# Patient Record
Sex: FEMALE | Race: WHITE | NOT HISPANIC OR LATINO | Employment: OTHER | URBAN - NONMETROPOLITAN AREA
[De-identification: names, ages, dates, MRNs, and addresses within clinical notes are randomized per-mention and may not be internally consistent; named-entity substitution may affect disease eponyms.]

---

## 2017-02-15 ENCOUNTER — OFFICE VISIT (OUTPATIENT)
Dept: FAMILY MEDICINE CLINIC | Facility: CLINIC | Age: 82
End: 2017-02-15

## 2017-02-15 VITALS
OXYGEN SATURATION: 96 % | BODY MASS INDEX: 27.32 KG/M2 | DIASTOLIC BLOOD PRESSURE: 70 MMHG | SYSTOLIC BLOOD PRESSURE: 110 MMHG | WEIGHT: 154.2 LBS | TEMPERATURE: 97.2 F | HEIGHT: 63 IN | HEART RATE: 77 BPM

## 2017-02-15 DIAGNOSIS — R06.02 SHORTNESS OF BREATH: Primary | ICD-10-CM

## 2017-02-15 DIAGNOSIS — J01.90 ACUTE SINUSITIS, RECURRENCE NOT SPECIFIED, UNSPECIFIED LOCATION: Primary | ICD-10-CM

## 2017-02-15 DIAGNOSIS — K21.9 GASTROESOPHAGEAL REFLUX DISEASE WITHOUT ESOPHAGITIS: Chronic | ICD-10-CM

## 2017-02-15 DIAGNOSIS — Z72.0 TOBACCO USER: Chronic | ICD-10-CM

## 2017-02-15 DIAGNOSIS — I10 ESSENTIAL HYPERTENSION: Chronic | ICD-10-CM

## 2017-02-15 PROCEDURE — 99214 OFFICE O/P EST MOD 30 MIN: CPT | Performed by: NURSE PRACTITIONER

## 2017-02-15 PROCEDURE — 96372 THER/PROPH/DIAG INJ SC/IM: CPT | Performed by: NURSE PRACTITIONER

## 2017-02-15 RX ORDER — AZITHROMYCIN 250 MG/1
TABLET, FILM COATED ORAL
Qty: 6 TABLET | Refills: 0 | Status: SHIPPED | OUTPATIENT
Start: 2017-02-15 | End: 2017-03-16

## 2017-02-15 RX ORDER — ESOMEPRAZOLE MAGNESIUM 40 MG/1
40 CAPSULE, DELAYED RELEASE ORAL
Qty: 30 CAPSULE | Refills: 5 | Status: SHIPPED | OUTPATIENT
Start: 2017-02-15 | End: 2018-07-09

## 2017-02-15 RX ORDER — METHYLPREDNISOLONE ACETATE 80 MG/ML
80 INJECTION, SUSPENSION INTRA-ARTICULAR; INTRALESIONAL; INTRAMUSCULAR; SOFT TISSUE ONCE
Status: COMPLETED | OUTPATIENT
Start: 2017-02-15 | End: 2017-02-15

## 2017-02-15 RX ADMIN — METHYLPREDNISOLONE ACETATE 80 MG: 80 INJECTION, SUSPENSION INTRA-ARTICULAR; INTRALESIONAL; INTRAMUSCULAR; SOFT TISSUE at 15:15

## 2017-02-15 NOTE — PROGRESS NOTES
Subjective   Annika Kimball is a 81 y.o. female. Patient here today with complaints of Sinusitis  pt here today for complaints of sinus pain , pressure, congestion, headache, does have cough which is producing clear sputum. No hx of heart issues but she does report shortness of breath on exertion. She cont to smoke. No nausea,no chest pain. Having L max facial pain. No fever. Has hx of gerd and htn, needing refills on nexium.     Vitals:    02/15/17 1455   BP: 110/70   Pulse: 77   Temp: 97.2 °F (36.2 °C)   SpO2: 96%     Past Medical History   Diagnosis Date   • Acute bronchitis    • Acute sinusitis    • Dizziness    • Dyslipidemia    • Dysuria    • Essential hypertension    • Gastroesophageal reflux disease    • Generalized abdominal pain    • Tobacco user      Sinusitis   This is a new problem. The current episode started 1 to 4 weeks ago. The problem is unchanged. There has been no fever. The pain is mild. Associated symptoms include congestion, coughing, headaches, a hoarse voice, sinus pressure and a sore throat. Pertinent negatives include no chills, diaphoresis, ear pain, neck pain, shortness of breath, sneezing or swollen glands. Past treatments include nothing. The treatment provided no relief.   Heartburn   She complains of coughing, a hoarse voice and a sore throat. She reports no abdominal pain, no belching, no chest pain, no choking, no dysphagia, no early satiety, no globus sensation, no heartburn, no nausea, no stridor or no wheezing. This is a chronic problem. The current episode started more than 1 year ago. The problem occurs occasionally. The problem has been waxing and waning. The symptoms are aggravated by smoking and certain foods. Pertinent negatives include no anemia, fatigue, melena, muscle weakness, orthopnea or weight loss. Risk factors include smoking/tobacco exposure. She has tried a PPI for the symptoms. The treatment provided moderate relief.   Hypertension   This is a chronic  problem. The current episode started more than 1 year ago. The problem is unchanged. The problem is controlled. Associated symptoms include headaches. Pertinent negatives include no anxiety, blurred vision, chest pain, malaise/fatigue, neck pain, orthopnea, palpitations, peripheral edema, PND, shortness of breath or sweats. There are no associated agents to hypertension. Risk factors for coronary artery disease include post-menopausal state and smoking/tobacco exposure. Past treatments include beta blockers. The current treatment provides moderate improvement. Compliance problems include exercise and diet.  There is no history of angina, kidney disease, CAD/MI, CVA, heart failure, left ventricular hypertrophy, PVD, renovascular disease, retinopathy or a thyroid problem. There is no history of chronic renal disease, coarctation of the aorta, hyperaldosteronism, hypercortisolism, hyperparathyroidism, a hypertension causing med, pheochromocytoma or sleep apnea.        The following portions of the patient's history were reviewed and updated as appropriate: allergies, current medications, past family history, past medical history, past social history, past surgical history and problem list.    Review of Systems   Constitutional: Negative.  Negative for chills, diaphoresis, fatigue, malaise/fatigue and weight loss.   HENT: Positive for congestion, hoarse voice, sinus pressure and sore throat. Negative for ear pain and sneezing.    Eyes: Negative.  Negative for blurred vision.   Respiratory: Positive for cough. Negative for choking, shortness of breath and wheezing.    Cardiovascular: Negative.  Negative for chest pain, palpitations, orthopnea and PND.   Gastrointestinal: Negative.  Negative for abdominal pain, dysphagia, heartburn, melena and nausea.   Endocrine: Negative.    Genitourinary: Negative.    Musculoskeletal: Negative.  Negative for muscle weakness and neck pain.   Skin: Negative.    Allergic/Immunologic:  Negative.    Neurological: Positive for headaches.   Hematological: Negative.    Psychiatric/Behavioral: Negative.        Objective   Physical Exam   Constitutional: She is oriented to person, place, and time. She appears well-developed and well-nourished. No distress.   HENT:   Head: Normocephalic and atraumatic.   Right Ear: Hearing, external ear and ear canal normal. No drainage, swelling or tenderness. A middle ear effusion is present. No decreased hearing is noted.   Left Ear: Hearing, external ear and ear canal normal. No drainage, swelling or tenderness. A middle ear effusion is present. No decreased hearing is noted.   Nose: Right sinus exhibits maxillary sinus tenderness and frontal sinus tenderness. Left sinus exhibits maxillary sinus tenderness and frontal sinus tenderness.   Mouth/Throat: Uvula is midline and mucous membranes are normal. Posterior oropharyngeal erythema present. No oropharyngeal exudate. No tonsillar exudate.   Eyes: EOM are normal. Right eye exhibits no discharge. Left eye exhibits no discharge.   Neck: Neck supple. Normal carotid pulses present. Carotid bruit is not present.   Cardiovascular: Normal rate, regular rhythm and normal heart sounds.  Exam reveals no gallop and no friction rub.    No murmur heard.  Pulmonary/Chest: Effort normal. No respiratory distress. She has decreased breath sounds in the right upper field, the right middle field, the right lower field, the left upper field, the left middle field and the left lower field. She has wheezes in the right upper field and the left upper field. She has no rales.   insp wheezes ausc throghout, pulse ox on RA 96%   Musculoskeletal: Normal range of motion.   Lymphadenopathy:     She has no cervical adenopathy.   Neurological: She is alert and oriented to person, place, and time.   Skin: Skin is warm and dry. No rash noted. She is not diaphoretic. No erythema. No pallor.   Psychiatric: She has a normal mood and affect. Her behavior  is normal. Judgment and thought content normal.   Nursing note and vitals reviewed.      Assessment/Plan   Annika was seen today for sinusitis.    Diagnoses and all orders for this visit:    Acute sinusitis, recurrence not specified, unspecified location  -     methylPREDNISolone acetate (DEPO-medrol) injection 80 mg; Inject 1 mL into the shoulder, thigh, or buttocks 1 (One) Time.    Essential hypertension    Gastroesophageal reflux disease without esophagitis    Tobacco user    Other orders  -     esomeprazole (nexIUM) 40 MG capsule; Take 1 capsule by mouth Every Morning Before Breakfast.  -     azithromycin (ZITHROMAX Z-JEFFERY) 250 MG tablet; Take 2 tablets the first day, then 1 tablet daily for 4 days.      She is given zithromax and depo 80mg inj IM in office today, zithromax and refilled nexium. She is to RTC in 6 months or sooner if symptoms persist or worsen. Advised to stop smoking. All questions and concerns are addressed with understanding noted. The patient is in agreement to above plan.

## 2017-02-20 DIAGNOSIS — R50.9 FEVER, UNSPECIFIED FEVER CAUSE: Primary | ICD-10-CM

## 2017-02-20 LAB
ALBUMIN SERPL-MCNC: 4.5 G/DL (ref 3.2–5.5)
ALBUMIN/GLOB SERPL: 1.4 G/DL (ref 1–3)
ALP SERPL-CCNC: 75 U/L (ref 15–121)
ALT SERPL W P-5'-P-CCNC: 11 U/L (ref 10–60)
ANION GAP SERPL CALCULATED.3IONS-SCNC: 10 MMOL/L (ref 5–15)
AST SERPL-CCNC: 22 U/L (ref 10–60)
BASOPHILS # BLD AUTO: 0.02 10*3/MM3 (ref 0–0.2)
BASOPHILS NFR BLD AUTO: 0.3 % (ref 0–2)
BILIRUB SERPL-MCNC: 1.5 MG/DL (ref 0.2–1)
BUN BLD-MCNC: 12 MG/DL (ref 8–25)
BUN/CREAT SERPL: 12 (ref 7–25)
CALCIUM SPEC-SCNC: 9.5 MG/DL (ref 8.4–10.8)
CHLORIDE SERPL-SCNC: 100 MMOL/L (ref 100–112)
CO2 SERPL-SCNC: 23 MMOL/L (ref 20–32)
CREAT BLD-MCNC: 1 MG/DL (ref 0.4–1.3)
DEPRECATED RDW RBC AUTO: 41.4 FL (ref 36.4–46.3)
EOSINOPHIL # BLD AUTO: 0 10*3/MM3 (ref 0–0.7)
EOSINOPHIL NFR BLD AUTO: 0 % (ref 0–7)
ERYTHROCYTE [DISTWIDTH] IN BLOOD BY AUTOMATED COUNT: 12.6 % (ref 11.5–14.5)
FLUAV AG NPH QL: POSITIVE
FLUBV AG NPH QL IA: NEGATIVE
GFR SERPL CREATININE-BSD FRML MDRD: 53 ML/MIN/1.73 (ref 39–90)
GLOBULIN UR ELPH-MCNC: 3.2 GM/DL (ref 2.5–4.6)
GLUCOSE BLD-MCNC: 138 MG/DL (ref 70–100)
HCT VFR BLD AUTO: 41.9 % (ref 35–45)
HGB BLD-MCNC: 14.3 G/DL (ref 12–15.5)
LYMPHOCYTES # BLD AUTO: 0.75 10*3/MM3 (ref 0.6–4.2)
LYMPHOCYTES NFR BLD AUTO: 9.5 % (ref 10–50)
MCH RBC QN AUTO: 31.2 PG (ref 26.5–34)
MCHC RBC AUTO-ENTMCNC: 34.1 G/DL (ref 31.4–36)
MCV RBC AUTO: 91.3 FL (ref 80–98)
MONOCYTES # BLD AUTO: 0.92 10*3/MM3 (ref 0–0.9)
MONOCYTES NFR BLD AUTO: 11.6 % (ref 0–12)
NEUTROPHILS # BLD AUTO: 6.21 10*3/MM3 (ref 2–8.6)
NEUTROPHILS NFR BLD AUTO: 78.6 % (ref 37–80)
PLATELET # BLD AUTO: 237 10*3/MM3 (ref 150–450)
PMV BLD AUTO: 10.1 FL (ref 8–12)
POTASSIUM BLD-SCNC: 4.2 MMOL/L (ref 3.4–5.4)
PROT SERPL-MCNC: 7.7 G/DL (ref 6.7–8.2)
RBC # BLD AUTO: 4.59 10*6/MM3 (ref 3.77–5.16)
SODIUM BLD-SCNC: 133 MMOL/L (ref 134–146)
WBC NRBC COR # BLD: 7.9 10*3/MM3 (ref 3.2–9.8)

## 2017-02-20 PROCEDURE — 36415 COLL VENOUS BLD VENIPUNCTURE: CPT | Performed by: NURSE PRACTITIONER

## 2017-02-20 PROCEDURE — 80053 COMPREHEN METABOLIC PANEL: CPT | Performed by: NURSE PRACTITIONER

## 2017-02-20 PROCEDURE — 85025 COMPLETE CBC W/AUTO DIFF WBC: CPT | Performed by: NURSE PRACTITIONER

## 2017-02-20 PROCEDURE — 87804 INFLUENZA ASSAY W/OPTIC: CPT | Performed by: NURSE PRACTITIONER

## 2017-02-20 RX ORDER — OSELTAMIVIR PHOSPHATE 75 MG/1
75 CAPSULE ORAL 2 TIMES DAILY
Qty: 10 CAPSULE | Refills: 0 | Status: SHIPPED | OUTPATIENT
Start: 2017-02-20 | End: 2017-03-16

## 2017-03-16 ENCOUNTER — OFFICE VISIT (OUTPATIENT)
Dept: CARDIOLOGY | Facility: CLINIC | Age: 82
End: 2017-03-16

## 2017-03-16 VITALS
OXYGEN SATURATION: 97 % | HEART RATE: 70 BPM | HEIGHT: 63 IN | DIASTOLIC BLOOD PRESSURE: 92 MMHG | WEIGHT: 147 LBS | SYSTOLIC BLOOD PRESSURE: 142 MMHG | BODY MASS INDEX: 26.05 KG/M2

## 2017-03-16 DIAGNOSIS — Z71.6 TOBACCO ABUSE COUNSELING: ICD-10-CM

## 2017-03-16 DIAGNOSIS — I11.0 HYPERTENSIVE HEART DISEASE WITH HEART FAILURE (HCC): ICD-10-CM

## 2017-03-16 DIAGNOSIS — I10 ESSENTIAL HYPERTENSION: ICD-10-CM

## 2017-03-16 DIAGNOSIS — I20.9 ISCHEMIC CHEST PAIN (HCC): Primary | ICD-10-CM

## 2017-03-16 PROCEDURE — 93010 ELECTROCARDIOGRAM REPORT: CPT | Performed by: INTERNAL MEDICINE

## 2017-03-16 PROCEDURE — 99203 OFFICE O/P NEW LOW 30 MIN: CPT | Performed by: INTERNAL MEDICINE

## 2017-03-16 RX ORDER — NITROGLYCERIN 0.4 MG/1
TABLET SUBLINGUAL
Qty: 100 TABLET | Refills: 11 | Status: SHIPPED | OUTPATIENT
Start: 2017-03-16 | End: 2017-07-11

## 2017-03-16 NOTE — PROGRESS NOTES
Chief complaint : Chest pain    History of Present Illness this is a very pleasant 81-year-old female who presents for cardiac evaluation.  Patient admits of having chest tightness which status during mental stress or physical exertion.  Patient stated the chest discomfort or tightness radiates to her left shoulder and it is associated with dyspnea.  In a scale of 1-10 patient states it can be 7 out of 10.  It can last several minutes and occasionally on and off all day.    Subjective      Review of Systems   Constitution: Positive for night sweats. Negative for chills, decreased appetite, diaphoresis, fever, weakness, malaise/fatigue, weight gain and weight loss.   HENT: Positive for ear pain. Negative for congestion, headaches, hearing loss, hoarse voice, nosebleeds, odynophagia, sore throat, stridor and tinnitus.    Eyes: Positive for blurred vision, double vision, visual disturbance and visual halos. Negative for pain, photophobia, redness, vision loss in left eye and vision loss in right eye.   Cardiovascular: Positive for chest pain, dyspnea on exertion, irregular heartbeat and palpitations. Negative for leg swelling, near-syncope, orthopnea, paroxysmal nocturnal dyspnea and syncope.   Respiratory: Positive for cough, snoring and sputum production. Negative for hemoptysis, shortness of breath and wheezing.    Endocrine: Negative.    Hematologic/Lymphatic: Negative.    Musculoskeletal: Positive for arthritis and joint pain. Negative for back pain, falls, gout, muscle weakness, myalgias and neck pain.   Gastrointestinal: Positive for abdominal pain and heartburn. Negative for bloating, anorexia, change in bowel habit, bowel incontinence, constipation, diarrhea, dysphagia, excessive appetite, hematemesis, hemorrhoids, jaundice, melena, nausea and vomiting.   Genitourinary: Negative.    Neurological: Negative.    Psychiatric/Behavioral: Negative.    Allergic/Immunologic: Negative.        Past Medical History    Diagnosis Date   • Acute bronchitis    • Acute sinusitis    • Dizziness    • Dyslipidemia    • Dysuria    • Essential hypertension    • Gastroesophageal reflux disease    • Generalized abdominal pain    • Tobacco user        No family history on file.    Sulfa antibiotics     reports that she has been smoking Cigarettes.  She has been smoking about 1.00 pack per day. She does not have any smokeless tobacco history on file. She reports that she does not drink alcohol or use illicit drugs.    Objective     Vital Signs  Heart Rate:  [70] 70  BP: (142)/(92) 142/92    Physical Exam      ECG 12 Lead  Date/Time: 3/16/2017 2:23 PM  Performed by: LEONARD TORRES  Authorized by: LEONARD TORRES   Previous ECG: no previous ECG available  Rhythm: sinus rhythm  Rate: normal  BPM: 63  Conduction: conduction normal  ST Segments: ST segments normal  T Waves: T waves normal  QRS axis: normal  Other: no other findings  Clinical impression: normal ECG            Assessment/Plan     Patient Active Problem List   Diagnosis   • Tobacco user   • Tobacco abuse counseling   • Ischemic chest pain   • Essential hypertension   • Hypertensive heart disease with heart failure      1. Ischemic chest pain  The patient described her symptoms appears to be typical of angina pectoralis.  Patient has been advised regarding risk factor modification.  Plan;  -Nitroglycerin 0.4 mg sublingual as needed for chest pain  -Fasting lipid panel  - ECG 12 Lead  - Stress Test With Myocardial Perfusion One Day; Future  - Adult Transthoracic Echo Complete    2. Essential hypertension  Her blood pressure appears to be fairly controlled with current medications.  Plan:  -Risk factor modification , continue with current medication  - Adult Transthoracic Echo Complete    3. Tobacco abuse counseling  Patient has been counseled regarding tobacco smoking cessation.  Patient has agreed to cut back on her cigarettes.  She stated that she is ready to quit smoking.    4.  Hypertensive heart disease with heart failure   We will continue with current dose of atenolol.  We will order 2-D echocardiogram to evaluate patient's LV systolic function as well as diastolic function.  - Adult Transthoracic Echo Complete    I discussed the patients findings and my recommendations with patient.    Suzie Choi MD  03/16/17  2:40 PM    EMR Dragon/Transcription disclaimer:   Much of this encounter note is an electronic transcription/translation of spoken language to printed text. The electronic translation of spoken language may permit erroneous, or at times, nonsensical words or phrases to be inadvertently transcribed; Although I have reviewed the note for such errors, some may still exist.

## 2017-04-07 LAB
BH CV ECHO MEAS - AO MAX PG (FULL): 1.1 MMHG
BH CV ECHO MEAS - AO MAX PG: 5.3 MMHG
BH CV ECHO MEAS - AO MEAN PG (FULL): 1 MMHG
BH CV ECHO MEAS - AO MEAN PG: 3 MMHG
BH CV ECHO MEAS - AO V2 MAX: 115 CM/SEC
BH CV ECHO MEAS - AO V2 MEAN: 76.9 CM/SEC
BH CV ECHO MEAS - AO V2 VTI: 27.3 CM
BH CV ECHO MEAS - BSA(HAYCOCK): 1.7 M^2
BH CV ECHO MEAS - BSA: 1.7 M^2
BH CV ECHO MEAS - BZI_BMI: 26 KILOGRAMS/M^2
BH CV ECHO MEAS - BZI_METRIC_HEIGHT: 160 CM
BH CV ECHO MEAS - BZI_METRIC_WEIGHT: 66.7 KG
BH CV ECHO MEAS - CONTRAST EF 4CH: 54.9 ML/M^2
BH CV ECHO MEAS - EDV(MOD-SP4): 106 ML
BH CV ECHO MEAS - ESV(MOD-SP4): 47.8 ML
BH CV ECHO MEAS - LA DIMENSION: 3.3 CM
BH CV ECHO MEAS - LV DIASTOLIC VOL/BSA (35-75): 62.5 ML/M^2
BH CV ECHO MEAS - LV MAX PG: 4.2 MMHG
BH CV ECHO MEAS - LV MEAN PG: 2 MMHG
BH CV ECHO MEAS - LV SYSTOLIC VOL/BSA (12-30): 28.2 ML/M^2
BH CV ECHO MEAS - LV V1 MAX: 102 CM/SEC
BH CV ECHO MEAS - LV V1 MEAN: 61.8 CM/SEC
BH CV ECHO MEAS - LV V1 VTI: 28.4 CM
BH CV ECHO MEAS - LVLD AP4: 6.6 CM
BH CV ECHO MEAS - LVLS AP4: 6.2 CM
BH CV ECHO MEAS - MR MAX PG: 20.8 MMHG
BH CV ECHO MEAS - MR MAX VEL: 228 CM/SEC
BH CV ECHO MEAS - MV A MAX VEL: 109 CM/SEC
BH CV ECHO MEAS - MV DEC SLOPE: 422 CM/SEC^2
BH CV ECHO MEAS - MV E MAX VEL: 88.3 CM/SEC
BH CV ECHO MEAS - MV E/A: 0.81
BH CV ECHO MEAS - PULM DIAS VEL: 44.1 CM/SEC
BH CV ECHO MEAS - PULM S/D: 1.5
BH CV ECHO MEAS - PULM SYS VEL: 64 CM/SEC
BH CV ECHO MEAS - RAP SYSTOLE: 10 MMHG
BH CV ECHO MEAS - RVSP: 32.1 MMHG
BH CV ECHO MEAS - SI(MOD-SP4): 34.3 ML/M^2
BH CV ECHO MEAS - SV(MOD-SP4): 58.2 ML
BH CV ECHO MEAS - TR MAX VEL: 235 CM/SEC

## 2017-04-27 ENCOUNTER — OFFICE VISIT (OUTPATIENT)
Dept: CARDIOLOGY | Facility: CLINIC | Age: 82
End: 2017-04-27

## 2017-04-27 VITALS
HEIGHT: 63 IN | OXYGEN SATURATION: 95 % | SYSTOLIC BLOOD PRESSURE: 138 MMHG | HEART RATE: 71 BPM | BODY MASS INDEX: 27.04 KG/M2 | DIASTOLIC BLOOD PRESSURE: 74 MMHG | WEIGHT: 152.6 LBS

## 2017-04-27 DIAGNOSIS — Z71.6 TOBACCO ABUSE COUNSELING: ICD-10-CM

## 2017-04-27 DIAGNOSIS — I11.0 HYPERTENSIVE HEART DISEASE WITH HEART FAILURE (HCC): ICD-10-CM

## 2017-04-27 DIAGNOSIS — I10 ESSENTIAL HYPERTENSION: Primary | ICD-10-CM

## 2017-04-27 PROCEDURE — 99212 OFFICE O/P EST SF 10 MIN: CPT | Performed by: INTERNAL MEDICINE

## 2017-04-27 RX ORDER — BRIMONIDINE TARTRATE/TIMOLOL 0.2%-0.5%
2 DROPS OPHTHALMIC (EYE) 2 TIMES DAILY
COMMUNITY
Start: 2017-04-26 | End: 2017-06-05

## 2017-06-05 ENCOUNTER — OFFICE VISIT (OUTPATIENT)
Dept: FAMILY MEDICINE CLINIC | Facility: CLINIC | Age: 82
End: 2017-06-05

## 2017-06-05 VITALS
BODY MASS INDEX: 25.91 KG/M2 | HEART RATE: 81 BPM | SYSTOLIC BLOOD PRESSURE: 112 MMHG | TEMPERATURE: 98.5 F | WEIGHT: 146.2 LBS | OXYGEN SATURATION: 94 % | DIASTOLIC BLOOD PRESSURE: 72 MMHG | HEIGHT: 63 IN

## 2017-06-05 DIAGNOSIS — R05.9 COUGH: ICD-10-CM

## 2017-06-05 DIAGNOSIS — J32.9 SINUSITIS, UNSPECIFIED CHRONICITY, UNSPECIFIED LOCATION: Primary | ICD-10-CM

## 2017-06-05 PROCEDURE — 99213 OFFICE O/P EST LOW 20 MIN: CPT | Performed by: NURSE PRACTITIONER

## 2017-06-05 PROCEDURE — 96372 THER/PROPH/DIAG INJ SC/IM: CPT | Performed by: NURSE PRACTITIONER

## 2017-06-05 RX ORDER — PROMETHAZINE HYDROCHLORIDE AND CODEINE PHOSPHATE 6.25; 1 MG/5ML; MG/5ML
5 SYRUP ORAL EVERY 4 HOURS PRN
Qty: 240 ML | Refills: 0 | Status: SHIPPED | OUTPATIENT
Start: 2017-06-05 | End: 2017-06-21 | Stop reason: SDUPTHER

## 2017-06-05 RX ORDER — CEFDINIR 300 MG/1
300 CAPSULE ORAL 2 TIMES DAILY
Qty: 20 CAPSULE | Refills: 0 | Status: SHIPPED | OUTPATIENT
Start: 2017-06-05 | End: 2017-06-21

## 2017-06-05 RX ORDER — METHYLPREDNISOLONE ACETATE 80 MG/ML
80 INJECTION, SUSPENSION INTRA-ARTICULAR; INTRALESIONAL; INTRAMUSCULAR; SOFT TISSUE ONCE
Status: COMPLETED | OUTPATIENT
Start: 2017-06-05 | End: 2017-06-05

## 2017-06-05 RX ADMIN — METHYLPREDNISOLONE ACETATE 80 MG: 80 INJECTION, SUSPENSION INTRA-ARTICULAR; INTRALESIONAL; INTRAMUSCULAR; SOFT TISSUE at 11:04

## 2017-06-06 NOTE — PROGRESS NOTES
Subjective   Annika Kimball is a 81 y.o. female. Patient here today with complaints of Sinusitis ([facility] number [id number])  Patient here today with daughter complaining of cough which is producing yellowish sputum and thick nasal drainage which is yellowish in color as well.  She has been sick since Friday.  Has had sick contacts, complaining of headache, denies fever.  Cough is keeping her up all night.  She continues to smoke.    Vitals:    06/05/17 1018   BP: 112/72   Pulse: 81   Temp: 98.5 °F (36.9 °C)   SpO2: 94%     Past Medical History:   Diagnosis Date   • Acute bronchitis    • Acute sinusitis    • Dizziness    • Dyslipidemia    • Dysuria    • Essential hypertension    • Gastroesophageal reflux disease    • Generalized abdominal pain    • Tobacco user      Sinusitis   This is a new problem. The current episode started in the past 7 days. The problem is unchanged. There has been no fever. The pain is mild. Associated symptoms include congestion, coughing, headaches, sinus pressure and a sore throat. Pertinent negatives include no chills, diaphoresis, ear pain, hoarse voice, neck pain, shortness of breath, sneezing or swollen glands. Past treatments include nothing. The treatment provided no relief.   Cough   This is a new problem. The current episode started in the past 7 days. The problem has been unchanged. The problem occurs every few minutes. The cough is productive of sputum and productive of purulent sputum. Associated symptoms include ear congestion, headaches, nasal congestion, postnasal drip, rhinorrhea and a sore throat. Pertinent negatives include no chest pain, chills, ear pain, fever, heartburn, hemoptysis, myalgias, rash, shortness of breath, sweats, weight loss or wheezing. Nothing aggravates the symptoms. Risk factors for lung disease include smoking/tobacco exposure. She has tried nothing for the symptoms. The treatment provided no relief.        The following portions of the patient's  history were reviewed and updated as appropriate: allergies, current medications, past family history, past medical history, past social history, past surgical history and problem list.    Review of Systems   Constitutional: Negative.  Negative for chills, diaphoresis, fever and weight loss.   HENT: Positive for congestion, postnasal drip, rhinorrhea, sinus pressure and sore throat. Negative for ear pain, hoarse voice and sneezing.    Eyes: Negative.    Respiratory: Positive for cough. Negative for hemoptysis, shortness of breath and wheezing.    Cardiovascular: Negative.  Negative for chest pain.   Gastrointestinal: Negative.  Negative for heartburn.   Endocrine: Negative.    Genitourinary: Negative.    Musculoskeletal: Negative.  Negative for myalgias and neck pain.   Skin: Negative.  Negative for rash.   Allergic/Immunologic: Negative.    Neurological: Positive for headaches.   Hematological: Negative.    Psychiatric/Behavioral: Negative.        Objective   Physical Exam   Constitutional: She is oriented to person, place, and time. She appears well-developed and well-nourished. No distress.   HENT:   Head: Normocephalic and atraumatic.   Right Ear: Hearing, external ear and ear canal normal. No drainage, swelling or tenderness. Tympanic membrane is bulging. A middle ear effusion is present. No decreased hearing is noted.   Left Ear: Hearing, external ear and ear canal normal. No drainage, swelling or tenderness. Tympanic membrane is bulging. A middle ear effusion is present. No decreased hearing is noted.   Nose: Right sinus exhibits maxillary sinus tenderness and frontal sinus tenderness. Left sinus exhibits maxillary sinus tenderness and frontal sinus tenderness.   Mouth/Throat: Uvula is midline and mucous membranes are normal. Posterior oropharyngeal erythema present. No oropharyngeal exudate. No tonsillar exudate.   Eyes: Right eye exhibits no discharge. Left eye exhibits no discharge.   Neck: Neck supple.    Cardiovascular: Normal rate, regular rhythm and normal heart sounds.  Exam reveals no gallop and no friction rub.    No murmur heard.  Pulmonary/Chest: Effort normal and breath sounds normal. No respiratory distress. She has no wheezes. She has no rales.   Musculoskeletal: Normal range of motion.   Lymphadenopathy:     She has no cervical adenopathy.   Neurological: She is alert and oriented to person, place, and time.   Skin: Skin is warm and dry. No rash noted. She is not diaphoretic. No erythema. No pallor.   Psychiatric: She has a normal mood and affect. Her behavior is normal. Judgment and thought content normal.   Nursing note and vitals reviewed.      Assessment/Plan   Annika was seen today for sinusitis.    Diagnoses and all orders for this visit:    Sinusitis, unspecified chronicity, unspecified location  -     methylPREDNISolone acetate (DEPO-medrol) injection 80 mg; Inject 1 mL into the shoulder, thigh, or buttocks 1 (One) Time.    Cough    Other orders  -     cefdinir (OMNICEF) 300 MG capsule; Take 1 capsule by mouth 2 (Two) Times a Day.  -     promethazine-codeine (PHENERGAN with CODEINE) 6.25-10 MG/5ML syrup; Take 5 mL by mouth Every 4 (Four) Hours As Needed for Cough.      Yosvany is obtained and reviewed.  I will give her Phenergan with codeine cough syrup and Omnicef as above.  She is also given a Depo-Medrol 80 mg injection IM in office today.  If her symptoms should persist or worsen she is advised to return here for follow-up otherwise I will see her back as scheduled for chronic conditions.  I will also advise that she stop smoking although she does not have desire in this currently. YOSVANY query complete. Treatment plan to include limited course of prescribed controlled substance. Risks including addiction, benefits, and alternatives presented to patient.  All questions and concerns are addressed with understanding noted. The patient is in agreement to above plan.

## 2017-06-21 ENCOUNTER — OFFICE VISIT (OUTPATIENT)
Dept: FAMILY MEDICINE CLINIC | Facility: CLINIC | Age: 82
End: 2017-06-21

## 2017-06-21 VITALS
HEIGHT: 63 IN | BODY MASS INDEX: 25.69 KG/M2 | HEART RATE: 74 BPM | TEMPERATURE: 97.7 F | SYSTOLIC BLOOD PRESSURE: 124 MMHG | DIASTOLIC BLOOD PRESSURE: 80 MMHG | WEIGHT: 145 LBS

## 2017-06-21 DIAGNOSIS — K59.00 CONSTIPATION, UNSPECIFIED CONSTIPATION TYPE: ICD-10-CM

## 2017-06-21 DIAGNOSIS — Z72.0 TOBACCO USER: Chronic | ICD-10-CM

## 2017-06-21 DIAGNOSIS — J40 BRONCHITIS: Primary | ICD-10-CM

## 2017-06-21 DIAGNOSIS — I10 ESSENTIAL HYPERTENSION: Chronic | ICD-10-CM

## 2017-06-21 DIAGNOSIS — R05.9 COUGH: ICD-10-CM

## 2017-06-21 DIAGNOSIS — R63.4 WEIGHT LOSS, UNINTENTIONAL: ICD-10-CM

## 2017-06-21 PROCEDURE — 99214 OFFICE O/P EST MOD 30 MIN: CPT | Performed by: NURSE PRACTITIONER

## 2017-06-21 RX ORDER — ALBUTEROL SULFATE 90 UG/1
2 AEROSOL, METERED RESPIRATORY (INHALATION) EVERY 4 HOURS PRN
Qty: 1 INHALER | Refills: 0 | Status: SHIPPED | OUTPATIENT
Start: 2017-06-21 | End: 2017-07-11

## 2017-06-21 RX ORDER — PREDNISONE 20 MG/1
20 TABLET ORAL 2 TIMES DAILY
Qty: 10 TABLET | Refills: 0 | Status: SHIPPED | OUTPATIENT
Start: 2017-06-21 | End: 2017-06-26

## 2017-06-21 RX ORDER — PROMETHAZINE HYDROCHLORIDE AND CODEINE PHOSPHATE 6.25; 1 MG/5ML; MG/5ML
5 SYRUP ORAL EVERY 4 HOURS PRN
Qty: 240 ML | Refills: 0 | Status: SHIPPED | OUTPATIENT
Start: 2017-06-21 | End: 2017-07-11

## 2017-06-21 RX ORDER — DOXYCYCLINE HYCLATE 100 MG/1
100 TABLET, DELAYED RELEASE ORAL 2 TIMES DAILY
Qty: 14 TABLET | Refills: 0 | Status: SHIPPED | OUTPATIENT
Start: 2017-06-21 | End: 2017-06-28

## 2017-06-21 NOTE — PROGRESS NOTES
Subjective   Annika Kimball is a 81 y.o. female. Patient here today with complaints of Cough (still has cough that will not go away)  Patient here today with daughter complaining of sore throat, drainage, productive cough with history of tobacco abuse and bronchitis.  She was recently seen and treated and her symptoms did improve but never resolved and now they have worsened.  Symptoms worsened after she had been outside while daughter was mowing.  She reports decreased appetite and constipation with BM 2 days ago.  Denies nausea vomiting diarrhea.  Relates that she has lost approximately 15 pounds in the last 6 months.  Mammogram and colonoscopy reportedly up-to-date.  She did not have hysterectomy or BSO.  Denies abdominal pain.    Vitals:    06/21/17 1351   BP: 124/80   Pulse: 74   Temp: 97.7 °F (36.5 °C)     Past Medical History:   Diagnosis Date   • Acute bronchitis    • Acute sinusitis    • Dizziness    • Dyslipidemia    • Dysuria    • Essential hypertension    • Gastroesophageal reflux disease    • Generalized abdominal pain    • Tobacco user      Cough   This is a recurrent problem. The current episode started more than 1 month ago. The problem has been gradually worsening. The problem occurs every few minutes. The cough is productive of purulent sputum and productive of sputum. Associated symptoms include heartburn, nasal congestion, postnasal drip, a sore throat, weight loss and wheezing. Pertinent negatives include no chest pain, chills, ear congestion, ear pain, fever, headaches, hemoptysis, myalgias, rash, rhinorrhea, shortness of breath or sweats. The symptoms are aggravated by dust and pollens. Risk factors for lung disease include smoking/tobacco exposure. She has tried a beta-agonist inhaler for the symptoms. The treatment provided mild relief. Her past medical history is significant for bronchitis.        The following portions of the patient's history were reviewed and updated as appropriate:  allergies, current medications, past family history, past medical history, past social history, past surgical history and problem list.    Review of Systems   Constitutional: Positive for appetite change, unexpected weight change and weight loss. Negative for chills and fever.   HENT: Positive for postnasal drip and sore throat. Negative for ear pain and rhinorrhea.    Eyes: Negative.    Respiratory: Positive for cough and wheezing. Negative for hemoptysis and shortness of breath.    Cardiovascular: Negative.  Negative for chest pain.   Gastrointestinal: Positive for constipation and heartburn.   Endocrine: Negative.    Genitourinary: Negative.    Musculoskeletal: Negative.  Negative for myalgias.   Skin: Negative.  Negative for rash.   Allergic/Immunologic: Negative.    Neurological: Negative.  Negative for headaches.   Hematological: Negative.    Psychiatric/Behavioral: Negative.        Objective   Physical Exam   Constitutional: She is oriented to person, place, and time. She appears well-developed and well-nourished. No distress.   HENT:   Head: Normocephalic and atraumatic.   Right Ear: Hearing, tympanic membrane, external ear and ear canal normal.   Left Ear: Hearing, tympanic membrane, external ear and ear canal normal.   Nose: Right sinus exhibits no maxillary sinus tenderness and no frontal sinus tenderness. Left sinus exhibits no maxillary sinus tenderness and no frontal sinus tenderness.   Mouth/Throat: Uvula is midline, oropharynx is clear and moist and mucous membranes are normal. No tonsillar exudate.   Neck: Neck supple.   Cardiovascular: Normal rate, regular rhythm and normal heart sounds.  Exam reveals no gallop and no friction rub.    No murmur heard.  Pulmonary/Chest: Effort normal. No accessory muscle usage. No apnea, no tachypnea and no bradypnea. No respiratory distress. She has decreased breath sounds in the right upper field, the right middle field, the right lower field, the left upper  field, the left middle field and the left lower field. She has wheezes. She has rhonchi. She has no rales.   Abdominal: Soft. Bowel sounds are normal. She exhibits no distension and no mass. There is no tenderness. There is no rebound and no guarding. No hernia.   Musculoskeletal: Normal range of motion.   Lymphadenopathy:     She has no cervical adenopathy.   Neurological: She is alert and oriented to person, place, and time.   Skin: Skin is warm and dry. No rash noted. She is not diaphoretic. No erythema. No pallor.   Psychiatric: She has a normal mood and affect. Her behavior is normal. Judgment and thought content normal.   Nursing note and vitals reviewed.      Assessment/Plan   Annika was seen today for cough.    Diagnoses and all orders for this visit:    Bronchitis  -     CT Chest With Contrast; Future  -     XR Abdomen Flat & Upright  -     CBC & Differential; Future  -     Comprehensive Metabolic Panel  -     TSH; Future    Tobacco user  -     CT Chest With Contrast; Future  -     XR Abdomen Flat & Upright  -     CBC & Differential; Future  -     Comprehensive Metabolic Panel  -     TSH; Future    Essential hypertension  -     CT Chest With Contrast; Future  -     XR Abdomen Flat & Upright  -     CBC & Differential; Future  -     Comprehensive Metabolic Panel  -     TSH; Future    Cough  -     CT Chest With Contrast; Future  -     XR Abdomen Flat & Upright  -     CBC & Differential; Future  -     Comprehensive Metabolic Panel  -     TSH; Future    Weight loss, unintentional  -     CT Chest With Contrast; Future  -     XR Abdomen Flat & Upright  -     CBC & Differential; Future  -     Comprehensive Metabolic Panel  -     TSH; Future    Constipation, unspecified constipation type    Other orders  -     albuterol (PROVENTIL HFA;VENTOLIN HFA) 108 (90 BASE) MCG/ACT inhaler; Inhale 2 puffs Every 4 (Four) Hours As Needed for Wheezing.  -     predniSONE (DELTASONE) 20 MG tablet; Take 1 tablet by mouth 2 (Two)  Times a Day for 5 days.  -     promethazine-codeine (PHENERGAN with CODEINE) 6.25-10 MG/5ML syrup; Take 5 mL by mouth Every 4 (Four) Hours As Needed for Cough.  -     doxycycline (DORYX) 100 MG enteric coated tablet; Take 1 tablet by mouth 2 (Two) Times a Day for 7 days.      I will give her refill on Phenergan with codeine cough syrup to use when necessary I'll give her doxycycline, prednisone and albuterol inhaler as above.  She is again advised to stop smoking.  I will obtain CT of chest and abdominal x-ray as well as above labs.  She will be informed of results.  They will monitor her weight frequently and his symptoms or weight loss or cough should persist or worsen she may need further diagnostic testing/evaluation.  They are aware.  At present she will return to clinic for recheck as scheduled for chronic conditions or when necessary otherwise.  All questions and concerns are addressed with understanding noted. The patient is in agreement to above plan.

## 2017-06-26 ENCOUNTER — LAB (OUTPATIENT)
Dept: LAB | Facility: OTHER | Age: 82
End: 2017-06-26

## 2017-06-26 DIAGNOSIS — J40 BRONCHITIS: ICD-10-CM

## 2017-06-26 DIAGNOSIS — R63.4 WEIGHT LOSS, UNINTENTIONAL: ICD-10-CM

## 2017-06-26 DIAGNOSIS — R05.9 COUGH: ICD-10-CM

## 2017-06-26 DIAGNOSIS — I10 ESSENTIAL HYPERTENSION: ICD-10-CM

## 2017-06-26 DIAGNOSIS — Z00.00 ROUTINE MEDICAL EXAM: Primary | ICD-10-CM

## 2017-06-26 DIAGNOSIS — Z72.0 TOBACCO USER: ICD-10-CM

## 2017-06-26 LAB
ALBUMIN SERPL-MCNC: 3.8 G/DL (ref 3.2–5.5)
ALBUMIN/GLOB SERPL: 1.2 G/DL (ref 1–3)
ALP SERPL-CCNC: 84 U/L (ref 15–121)
ALT SERPL W P-5'-P-CCNC: 14 U/L (ref 10–60)
ANION GAP SERPL CALCULATED.3IONS-SCNC: 10 MMOL/L (ref 5–15)
AST SERPL-CCNC: 16 U/L (ref 10–60)
BASOPHILS # BLD AUTO: 0.02 10*3/MM3 (ref 0–0.2)
BASOPHILS NFR BLD AUTO: 0.2 % (ref 0–2)
BILIRUB SERPL-MCNC: 0.7 MG/DL (ref 0.2–1)
BUN BLD-MCNC: 14 MG/DL (ref 8–25)
BUN/CREAT SERPL: 15.6 (ref 7–25)
CALCIUM SPEC-SCNC: 9.2 MG/DL (ref 8.4–10.8)
CHLORIDE SERPL-SCNC: 99 MMOL/L (ref 100–112)
CO2 SERPL-SCNC: 27 MMOL/L (ref 20–32)
CREAT BLD-MCNC: 0.9 MG/DL (ref 0.4–1.3)
DEPRECATED RDW RBC AUTO: 42.2 FL (ref 36.4–46.3)
EOSINOPHIL # BLD AUTO: 0.03 10*3/MM3 (ref 0–0.7)
EOSINOPHIL NFR BLD AUTO: 0.2 % (ref 0–7)
ERYTHROCYTE [DISTWIDTH] IN BLOOD BY AUTOMATED COUNT: 12.8 % (ref 11.5–14.5)
GFR SERPL CREATININE-BSD FRML MDRD: 60 ML/MIN/1.73 (ref 39–90)
GLOBULIN UR ELPH-MCNC: 3.2 GM/DL (ref 2.5–4.6)
GLUCOSE BLD-MCNC: 92 MG/DL (ref 70–100)
HCT VFR BLD AUTO: 39.5 % (ref 35–45)
HGB BLD-MCNC: 13.1 G/DL (ref 12–15.5)
LYMPHOCYTES # BLD AUTO: 3.14 10*3/MM3 (ref 0.6–4.2)
LYMPHOCYTES NFR BLD AUTO: 25.8 % (ref 10–50)
MCH RBC QN AUTO: 30.9 PG (ref 26.5–34)
MCHC RBC AUTO-ENTMCNC: 33.2 G/DL (ref 31.4–36)
MCV RBC AUTO: 93.2 FL (ref 80–98)
MONOCYTES # BLD AUTO: 0.96 10*3/MM3 (ref 0–0.9)
MONOCYTES NFR BLD AUTO: 7.9 % (ref 0–12)
NEUTROPHILS # BLD AUTO: 8.03 10*3/MM3 (ref 2–8.6)
NEUTROPHILS NFR BLD AUTO: 65.9 % (ref 37–80)
PLATELET # BLD AUTO: 503 10*3/MM3 (ref 150–450)
PMV BLD AUTO: 9.1 FL (ref 8–12)
POTASSIUM BLD-SCNC: 3.2 MMOL/L (ref 3.4–5.4)
PROT SERPL-MCNC: 7 G/DL (ref 6.7–8.2)
RBC # BLD AUTO: 4.24 10*6/MM3 (ref 3.77–5.16)
SODIUM BLD-SCNC: 136 MMOL/L (ref 134–146)
WBC NRBC COR # BLD: 12.18 10*3/MM3 (ref 3.2–9.8)

## 2017-06-26 PROCEDURE — 84443 ASSAY THYROID STIM HORMONE: CPT | Performed by: NURSE PRACTITIONER

## 2017-06-26 PROCEDURE — 85025 COMPLETE CBC W/AUTO DIFF WBC: CPT | Performed by: NURSE PRACTITIONER

## 2017-06-26 PROCEDURE — 80053 COMPREHEN METABOLIC PANEL: CPT | Performed by: NURSE PRACTITIONER

## 2017-06-26 PROCEDURE — 36415 COLL VENOUS BLD VENIPUNCTURE: CPT | Performed by: NURSE PRACTITIONER

## 2017-06-27 LAB — TSH SERPL DL<=0.05 MIU/L-ACNC: 2.14 MIU/ML (ref 0.46–4.68)

## 2017-07-06 DIAGNOSIS — R91.8 LUNG NODULES: Primary | ICD-10-CM

## 2017-07-11 ENCOUNTER — OFFICE VISIT (OUTPATIENT)
Dept: FAMILY MEDICINE CLINIC | Facility: CLINIC | Age: 82
End: 2017-07-11

## 2017-07-11 VITALS
SYSTOLIC BLOOD PRESSURE: 122 MMHG | WEIGHT: 145 LBS | OXYGEN SATURATION: 98 % | HEIGHT: 63 IN | HEART RATE: 76 BPM | DIASTOLIC BLOOD PRESSURE: 78 MMHG | BODY MASS INDEX: 25.69 KG/M2 | TEMPERATURE: 97.9 F

## 2017-07-11 DIAGNOSIS — R30.0 DYSURIA: Primary | ICD-10-CM

## 2017-07-11 DIAGNOSIS — R25.2 CRAMP OF BOTH LOWER EXTREMITIES: ICD-10-CM

## 2017-07-11 DIAGNOSIS — E87.6 HYPOKALEMIA: ICD-10-CM

## 2017-07-11 DIAGNOSIS — Z72.0 TOBACCO USER: ICD-10-CM

## 2017-07-11 DIAGNOSIS — I10 ESSENTIAL HYPERTENSION: ICD-10-CM

## 2017-07-11 LAB
BACTERIA UR QL AUTO: ABNORMAL /HPF
BILIRUB UR QL STRIP: NEGATIVE
CLARITY UR: ABNORMAL
COLOR UR: YELLOW
GLUCOSE UR STRIP-MCNC: NEGATIVE MG/DL
HGB UR QL STRIP.AUTO: NEGATIVE
HYALINE CASTS UR QL AUTO: ABNORMAL /LPF
KETONES UR QL STRIP: NEGATIVE
LEUKOCYTE ESTERASE UR QL STRIP.AUTO: ABNORMAL
NITRITE UR QL STRIP: NEGATIVE
PH UR STRIP.AUTO: 6.5 [PH] (ref 5.5–8)
POTASSIUM BLD-SCNC: 4.2 MMOL/L (ref 3.4–5.4)
PROT UR QL STRIP: NEGATIVE
RBC # UR: ABNORMAL /HPF
REF LAB TEST METHOD: ABNORMAL
RENAL EPI CELLS #/AREA URNS HPF: ABNORMAL /HPF
SP GR UR STRIP: 1.01 (ref 1–1.03)
SQUAMOUS #/AREA URNS HPF: ABNORMAL /HPF
UROBILINOGEN UR QL STRIP: ABNORMAL
WBC UR QL AUTO: ABNORMAL /HPF

## 2017-07-11 PROCEDURE — 87086 URINE CULTURE/COLONY COUNT: CPT | Performed by: NURSE PRACTITIONER

## 2017-07-11 PROCEDURE — 81001 URINALYSIS AUTO W/SCOPE: CPT | Performed by: NURSE PRACTITIONER

## 2017-07-11 PROCEDURE — 36415 COLL VENOUS BLD VENIPUNCTURE: CPT | Performed by: NURSE PRACTITIONER

## 2017-07-11 PROCEDURE — 84132 ASSAY OF SERUM POTASSIUM: CPT | Performed by: NURSE PRACTITIONER

## 2017-07-11 PROCEDURE — 99214 OFFICE O/P EST MOD 30 MIN: CPT | Performed by: NURSE PRACTITIONER

## 2017-07-11 RX ORDER — CIPROFLOXACIN 250 MG/1
250 TABLET, FILM COATED ORAL 2 TIMES DAILY
Qty: 6 TABLET | Refills: 0 | Status: SHIPPED | OUTPATIENT
Start: 2017-07-11 | End: 2017-07-14

## 2017-07-11 NOTE — PROGRESS NOTES
Subjective   Annika Kimball is a 81 y.o. female. Patient here today with complaints of Urinary Tract Infection  Patient here today with complaints of dysuria and lower back pain ×1 week.  She is somewhat better now than she was initially.  Concerned about UTI.  Denies fever or chills.  Denies nausea vomiting diarrhea.  Relates cough has improved.  She is due for repeat CT of chest which she is aware of in 3 months.  Has increased water intake which she feels has improved her dysuria somewhat.  Also complaining of leg cramps and was told previously that she did have hypokalemia.  Has not been taking any potassium medication or increasing potassium in her diet.  She continues to smoke.    Vitals:    07/11/17 1428   BP: 122/78   Pulse: 76   Temp: 97.9 °F (36.6 °C)   SpO2: 98%     Past Medical History:   Diagnosis Date   • Acute bronchitis    • Acute sinusitis    • Dizziness    • Dyslipidemia    • Dysuria    • Essential hypertension    • Gastroesophageal reflux disease    • Generalized abdominal pain    • Tobacco user      Difficulty Urinating   This is a new problem. The current episode started in the past 7 days. The problem occurs intermittently. The problem has been waxing and waning. Associated symptoms include abdominal pain and myalgias. Pertinent negatives include no anorexia, arthralgias, change in bowel habit, chest pain, chills, congestion, coughing, diaphoresis, fatigue, fever, headaches, joint swelling, nausea, neck pain, numbness, rash, sore throat, swollen glands, urinary symptoms, vertigo, visual change, vomiting or weakness. Nothing aggravates the symptoms. She has tried drinking for the symptoms. The treatment provided mild relief.   Muscle Pain   This is a new problem. The current episode started in the past 7 days. The problem occurs intermittently. The problem has been waxing and waning since onset. The context of the pain is unknown. The pain is present in the left lower leg and right lower  leg. The pain is mild. Nothing aggravates the symptoms. Associated symptoms include abdominal pain and dysuria. Pertinent negatives include no chest pain, constipation, diarrhea, eye pain, fatigue, fever, headaches, joint swelling, nausea, rash, stiffness, sensory change, shortness of breath, swollen glands, urinary symptoms, vaginal discharge, visual change, vomiting, weakness or wheezing. Past treatments include nothing. There is no swelling present.        The following portions of the patient's history were reviewed and updated as appropriate: allergies, current medications, past family history, past medical history, past social history, past surgical history and problem list.    Review of Systems   Constitutional: Negative.  Negative for chills, diaphoresis, fatigue and fever.   HENT: Negative.  Negative for congestion and sore throat.    Eyes: Negative.  Negative for pain.   Respiratory: Negative.  Negative for cough, shortness of breath and wheezing.    Cardiovascular: Negative.  Negative for chest pain.   Gastrointestinal: Positive for abdominal pain. Negative for anorexia, change in bowel habit, constipation, diarrhea, nausea and vomiting.   Endocrine: Negative.    Genitourinary: Positive for difficulty urinating and dysuria. Negative for vaginal discharge.   Musculoskeletal: Positive for myalgias. Negative for arthralgias, joint swelling, neck pain and stiffness.   Skin: Negative.  Negative for rash.   Allergic/Immunologic: Negative.    Neurological: Negative.  Negative for vertigo, sensory change, weakness, numbness and headaches.   Hematological: Negative.    Psychiatric/Behavioral: Negative.        Objective   Physical Exam   Constitutional: She is oriented to person, place, and time. She appears well-developed and well-nourished. No distress.   HENT:   Head: Normocephalic and atraumatic.   Cardiovascular: Normal rate, regular rhythm, normal heart sounds and intact distal pulses.  Exam reveals no gallop  and no friction rub.    No murmur heard.  Pulmonary/Chest: Effort normal and breath sounds normal. No respiratory distress. She has no wheezes. She has no rales.   Kyphosis noted    Abdominal: Soft. Normal appearance and bowel sounds are normal. She exhibits no distension and no mass. There is no hepatosplenomegaly. There is no tenderness. There is no rigidity, no rebound, no guarding, no CVA tenderness, no tenderness at McBurney's point and negative Hernandez's sign. No hernia.   Musculoskeletal: She exhibits tenderness. She exhibits no edema.        Lumbar back: She exhibits tenderness and bony tenderness.   Varicose veins BLE , dp pulses palp bilat    Neurological: She is alert and oriented to person, place, and time.   Skin: Skin is warm and dry. No rash noted. She is not diaphoretic. No erythema. No pallor.   Psychiatric: She has a normal mood and affect. Her behavior is normal. Judgment and thought content normal.   Nursing note and vitals reviewed.      Assessment/Plan   Annika was seen today for urinary tract infection.    Diagnoses and all orders for this visit:    Dysuria    Cramp of both lower extremities  -     Potassium    Hypokalemia    Essential hypertension    Tobacco user    Other orders  -     ciprofloxacin (CIPRO) 250 MG tablet; Take 1 tablet by mouth 2 (Two) Times a Day for 3 days.    Urinalysis is obtained and culture will be added if necessary.  I will advise that she continue to increase clear fluids, water and cranberry juice and will be given Cipro as above.  I will repeat potassium today and if necessary can treat her with medication otherwise she will continue with over-the-counter diet changes and taking in an increased potassium rich foods.  If her symptoms should persist or worsen she will return to clinic for follow-up otherwise I will see her back in 3 months for recheck and repeat CT of chest and in 6 months for recheck of chronic conditions.  She is aware.  All questions and concerns  are addressed with understanding noted. The patient is in agreement to above plan.

## 2017-07-13 LAB — BACTERIA SPEC AEROBE CULT: NORMAL

## 2017-12-27 ENCOUNTER — OFFICE VISIT (OUTPATIENT)
Dept: FAMILY MEDICINE CLINIC | Facility: CLINIC | Age: 82
End: 2017-12-27

## 2017-12-27 VITALS
WEIGHT: 146 LBS | HEART RATE: 79 BPM | DIASTOLIC BLOOD PRESSURE: 80 MMHG | OXYGEN SATURATION: 92 % | SYSTOLIC BLOOD PRESSURE: 132 MMHG | HEIGHT: 63 IN | BODY MASS INDEX: 25.87 KG/M2 | TEMPERATURE: 99.3 F

## 2017-12-27 DIAGNOSIS — J40 BRONCHITIS: Primary | ICD-10-CM

## 2017-12-27 DIAGNOSIS — R05.9 COUGH: ICD-10-CM

## 2017-12-27 DIAGNOSIS — J32.9 SINUSITIS, UNSPECIFIED CHRONICITY, UNSPECIFIED LOCATION: ICD-10-CM

## 2017-12-27 PROCEDURE — 96372 THER/PROPH/DIAG INJ SC/IM: CPT | Performed by: NURSE PRACTITIONER

## 2017-12-27 PROCEDURE — 99213 OFFICE O/P EST LOW 20 MIN: CPT | Performed by: NURSE PRACTITIONER

## 2017-12-27 RX ORDER — AZITHROMYCIN 250 MG/1
TABLET, FILM COATED ORAL
Qty: 6 TABLET | Refills: 0 | Status: SHIPPED | OUTPATIENT
Start: 2017-12-27 | End: 2018-07-09

## 2017-12-27 RX ORDER — ALBUTEROL SULFATE 90 UG/1
2 AEROSOL, METERED RESPIRATORY (INHALATION) EVERY 4 HOURS PRN
Qty: 1 INHALER | Refills: 0 | Status: SHIPPED | OUTPATIENT
Start: 2017-12-27 | End: 2018-07-09

## 2017-12-27 RX ORDER — TRIAMCINOLONE ACETONIDE 40 MG/ML
40 INJECTION, SUSPENSION INTRA-ARTICULAR; INTRAMUSCULAR ONCE
Status: COMPLETED | OUTPATIENT
Start: 2017-12-27 | End: 2017-12-27

## 2017-12-27 RX ORDER — BENZONATATE 200 MG/1
200 CAPSULE ORAL 3 TIMES DAILY PRN
Qty: 30 CAPSULE | Refills: 0 | Status: SHIPPED | OUTPATIENT
Start: 2017-12-27 | End: 2018-07-09

## 2017-12-27 RX ADMIN — TRIAMCINOLONE ACETONIDE 40 MG: 40 INJECTION, SUSPENSION INTRA-ARTICULAR; INTRAMUSCULAR at 09:22

## 2017-12-27 NOTE — PROGRESS NOTES
Subjective   Annika Kimball is a 82 y.o. female. Patient here today with complaints of Bronchitis  pt here today with complaints of productive cough , producing thick sputum. Has been sick x 4 days. Has had sick contacts. Cannot sleep due to cough. Also has nasal congestion, low grade fever. Good I&O.     Vitals:    12/27/17 0901   BP: 132/80   Pulse: 79   Temp: 99.3 °F (37.4 °C)   SpO2: 92%     Past Medical History:   Diagnosis Date   • Acute bronchitis    • Acute sinusitis    • Dizziness    • Dyslipidemia    • Dysuria    • Essential hypertension    • Gastroesophageal reflux disease    • Generalized abdominal pain    • Tobacco user      Cough   This is a new problem. The current episode started in the past 7 days. The problem has been gradually worsening. The problem occurs every few minutes. The cough is productive of sputum and productive of purulent sputum. Associated symptoms include ear congestion, a fever, headaches, nasal congestion, postnasal drip, rhinorrhea, a sore throat and wheezing. Pertinent negatives include no chest pain, chills, ear pain, heartburn, hemoptysis, myalgias, rash, shortness of breath, sweats or weight loss. Risk factors for lung disease include smoking/tobacco exposure.   Sinusitis   This is a new problem. The current episode started in the past 7 days. The problem is unchanged. Associated symptoms include coughing, headaches, a hoarse voice, sinus pressure and a sore throat. Pertinent negatives include no chills, ear pain, neck pain, shortness of breath, sneezing or swollen glands. Past treatments include nothing. The treatment provided no relief.        The following portions of the patient's history were reviewed and updated as appropriate: allergies, current medications, past family history, past medical history, past social history, past surgical history and problem list.    Review of Systems   Constitutional: Positive for fever. Negative for chills and weight loss.   HENT:  Positive for hoarse voice, postnasal drip, rhinorrhea, sinus pressure and sore throat. Negative for ear pain and sneezing.    Eyes: Negative.    Respiratory: Positive for cough and wheezing. Negative for hemoptysis and shortness of breath.    Cardiovascular: Negative.  Negative for chest pain.   Gastrointestinal: Negative.  Negative for heartburn.   Endocrine: Negative.    Genitourinary: Negative.    Musculoskeletal: Negative.  Negative for myalgias and neck pain.   Skin: Negative.  Negative for rash.   Allergic/Immunologic: Negative.    Neurological: Positive for headaches.   Hematological: Negative.    Psychiatric/Behavioral: Negative.        Objective   Physical Exam   Constitutional: She is oriented to person, place, and time. She appears well-developed and well-nourished. No distress.   HENT:   Head: Normocephalic and atraumatic.   Right Ear: Hearing, external ear and ear canal normal. No drainage, swelling or tenderness. A middle ear effusion is present. No decreased hearing is noted.   Left Ear: Hearing, external ear and ear canal normal. No drainage, swelling or tenderness. A middle ear effusion is present. No decreased hearing is noted.   Nose: Right sinus exhibits maxillary sinus tenderness and frontal sinus tenderness. Left sinus exhibits maxillary sinus tenderness and frontal sinus tenderness.   Mouth/Throat: Uvula is midline and mucous membranes are normal. Posterior oropharyngeal erythema present. No tonsillar exudate.   Neck: Neck supple.   Cardiovascular: Normal rate, regular rhythm and normal heart sounds.  Exam reveals no gallop and no friction rub.    No murmur heard.  Pulmonary/Chest: Effort normal. No respiratory distress. She has wheezes. She has no rales.   Scattered wheezes auscultated throughout, pulse ox on room air 92%   Lymphadenopathy:     She has no cervical adenopathy.   Neurological: She is alert and oriented to person, place, and time.   Skin: Skin is warm and dry. No rash noted. She  is not diaphoretic. No erythema. No pallor.   Psychiatric: She has a normal mood and affect. Her behavior is normal.   Nursing note and vitals reviewed.      Assessment/Plan   Annika was seen today for bronchitis.    Diagnoses and all orders for this visit:    Bronchitis    Sinusitis, unspecified chronicity, unspecified location  -     triamcinolone acetonide (KENALOG-40) injection 40 mg; Inject 1 mL into the shoulder, thigh, or buttocks 1 (One) Time.    Cough    Other orders  -     albuterol (PROVENTIL HFA;VENTOLIN HFA) 108 (90 Base) MCG/ACT inhaler; Inhale 2 puffs Every 4 (Four) Hours As Needed for Wheezing.  -     azithromycin (ZITHROMAX Z-JEFFERY) 250 MG tablet; Take 2 tablets the first day, then 1 tablet daily for 4 days.  -     benzonatate (TESSALON) 200 MG capsule; Take 1 capsule by mouth 3 (Three) Times a Day As Needed for Cough.    She is given Kenalog injection IM in office today, advised to stop smoking, given Zithromax, Tessalon Perles when necessary cough and Proventil when necessary cough/wheeze.  If her symptoms should persist or worsen she will return to clinic for follow-up.  Otherwise, I will see her back as scheduled for chronic conditions.  She is aware and is in agreement to this plan.  All questions and concerns are addressed with understanding noted.

## 2018-02-01 RX ORDER — OSELTAMIVIR PHOSPHATE 75 MG/1
75 CAPSULE ORAL DAILY
Qty: 10 CAPSULE | Refills: 0 | Status: SHIPPED | OUTPATIENT
Start: 2018-02-01 | End: 2018-07-09

## 2018-02-27 RX ORDER — ATENOLOL 50 MG/1
TABLET ORAL
Qty: 30 TABLET | Refills: 5 | Status: SHIPPED | OUTPATIENT
Start: 2018-02-27 | End: 2019-07-10

## 2018-07-09 ENCOUNTER — OFFICE VISIT (OUTPATIENT)
Dept: FAMILY MEDICINE CLINIC | Facility: CLINIC | Age: 83
End: 2018-07-09

## 2018-07-09 ENCOUNTER — LAB (OUTPATIENT)
Dept: LAB | Facility: OTHER | Age: 83
End: 2018-07-09

## 2018-07-09 VITALS
HEART RATE: 78 BPM | TEMPERATURE: 97.7 F | WEIGHT: 156.4 LBS | HEIGHT: 63 IN | BODY MASS INDEX: 27.71 KG/M2 | DIASTOLIC BLOOD PRESSURE: 72 MMHG | SYSTOLIC BLOOD PRESSURE: 122 MMHG

## 2018-07-09 DIAGNOSIS — J01.10 ACUTE NON-RECURRENT FRONTAL SINUSITIS: ICD-10-CM

## 2018-07-09 DIAGNOSIS — J01.00 ACUTE NON-RECURRENT MAXILLARY SINUSITIS: Primary | ICD-10-CM

## 2018-07-09 DIAGNOSIS — E78.5 DYSLIPIDEMIA: ICD-10-CM

## 2018-07-09 DIAGNOSIS — I10 ESSENTIAL HYPERTENSION: ICD-10-CM

## 2018-07-09 DIAGNOSIS — Z72.0 TOBACCO USER: Chronic | ICD-10-CM

## 2018-07-09 DIAGNOSIS — R73.09 ELEVATED GLUCOSE: ICD-10-CM

## 2018-07-09 DIAGNOSIS — E78.5 DYSLIPIDEMIA: Chronic | ICD-10-CM

## 2018-07-09 DIAGNOSIS — I10 ESSENTIAL HYPERTENSION: Chronic | ICD-10-CM

## 2018-07-09 DIAGNOSIS — R73.09 ELEVATED GLUCOSE: Primary | ICD-10-CM

## 2018-07-09 LAB
ALBUMIN SERPL-MCNC: 4.5 G/DL (ref 3.5–5)
ALBUMIN/GLOB SERPL: 1.6 G/DL (ref 1.1–1.8)
ALP SERPL-CCNC: 63 U/L (ref 38–126)
ALT SERPL W P-5'-P-CCNC: 16 U/L
ANION GAP SERPL CALCULATED.3IONS-SCNC: 10 MMOL/L (ref 5–15)
AST SERPL-CCNC: 26 U/L (ref 14–36)
BASOPHILS # BLD AUTO: 0.01 10*3/MM3 (ref 0–0.2)
BASOPHILS NFR BLD AUTO: 0.2 % (ref 0–2)
BILIRUB SERPL-MCNC: 1 MG/DL (ref 0.2–1.3)
BUN BLD-MCNC: 14 MG/DL (ref 7–17)
BUN/CREAT SERPL: 14.1 (ref 7–25)
CALCIUM SPEC-SCNC: 9.4 MG/DL (ref 8.4–10.2)
CHLORIDE SERPL-SCNC: 102 MMOL/L (ref 98–107)
CHOLEST SERPL-MCNC: 246 MG/DL (ref 150–200)
CO2 SERPL-SCNC: 28 MMOL/L (ref 22–30)
CREAT BLD-MCNC: 0.99 MG/DL (ref 0.52–1.04)
DEPRECATED RDW RBC AUTO: 43.3 FL (ref 36.4–46.3)
EOSINOPHIL # BLD AUTO: 0.12 10*3/MM3 (ref 0–0.7)
EOSINOPHIL NFR BLD AUTO: 1.8 % (ref 0–7)
ERYTHROCYTE [DISTWIDTH] IN BLOOD BY AUTOMATED COUNT: 12.8 % (ref 11.5–14.5)
GFR SERPL CREATININE-BSD FRML MDRD: 54 ML/MIN/1.73 (ref 39–90)
GLOBULIN UR ELPH-MCNC: 2.9 GM/DL (ref 2.3–3.5)
GLUCOSE BLD-MCNC: 114 MG/DL (ref 74–99)
HBA1C MFR BLD: 6 % (ref 4–5.6)
HCT VFR BLD AUTO: 42.9 % (ref 35–45)
HDLC SERPL-MCNC: 68 MG/DL (ref 40–59)
HGB BLD-MCNC: 14.3 G/DL (ref 12–15.5)
LDLC SERPL CALC-MCNC: 160 MG/DL
LDLC/HDLC SERPL: 2.35 {RATIO} (ref 0–3.22)
LYMPHOCYTES # BLD AUTO: 2.46 10*3/MM3 (ref 0.6–4.2)
LYMPHOCYTES NFR BLD AUTO: 36.9 % (ref 10–50)
MCH RBC QN AUTO: 31.7 PG (ref 26.5–34)
MCHC RBC AUTO-ENTMCNC: 33.3 G/DL (ref 31.4–36)
MCV RBC AUTO: 95.1 FL (ref 80–98)
MONOCYTES # BLD AUTO: 0.57 10*3/MM3 (ref 0–0.9)
MONOCYTES NFR BLD AUTO: 8.6 % (ref 0–12)
NEUTROPHILS # BLD AUTO: 3.5 10*3/MM3 (ref 2–8.6)
NEUTROPHILS NFR BLD AUTO: 52.5 % (ref 37–80)
PLATELET # BLD AUTO: 280 10*3/MM3 (ref 150–450)
PMV BLD AUTO: 9.4 FL (ref 8–12)
POTASSIUM BLD-SCNC: 4 MMOL/L (ref 3.4–5)
PROT SERPL-MCNC: 7.4 G/DL (ref 6.3–8.2)
RBC # BLD AUTO: 4.51 10*6/MM3 (ref 3.77–5.16)
SODIUM BLD-SCNC: 140 MMOL/L (ref 137–145)
TRIGL SERPL-MCNC: 92 MG/DL
TSH SERPL DL<=0.05 MIU/L-ACNC: 3.31 MIU/ML (ref 0.46–4.68)
VLDLC SERPL-MCNC: 18.4 MG/DL
WBC NRBC COR # BLD: 6.66 10*3/MM3 (ref 3.2–9.8)

## 2018-07-09 PROCEDURE — 96372 THER/PROPH/DIAG INJ SC/IM: CPT | Performed by: NURSE PRACTITIONER

## 2018-07-09 PROCEDURE — 36415 COLL VENOUS BLD VENIPUNCTURE: CPT | Performed by: NURSE PRACTITIONER

## 2018-07-09 PROCEDURE — 80053 COMPREHEN METABOLIC PANEL: CPT | Performed by: NURSE PRACTITIONER

## 2018-07-09 PROCEDURE — 80061 LIPID PANEL: CPT | Performed by: NURSE PRACTITIONER

## 2018-07-09 PROCEDURE — 83036 HEMOGLOBIN GLYCOSYLATED A1C: CPT | Performed by: NURSE PRACTITIONER

## 2018-07-09 PROCEDURE — 85025 COMPLETE CBC W/AUTO DIFF WBC: CPT | Performed by: NURSE PRACTITIONER

## 2018-07-09 PROCEDURE — 99214 OFFICE O/P EST MOD 30 MIN: CPT | Performed by: NURSE PRACTITIONER

## 2018-07-09 PROCEDURE — 84443 ASSAY THYROID STIM HORMONE: CPT | Performed by: NURSE PRACTITIONER

## 2018-07-09 RX ORDER — CEFDINIR 300 MG/1
300 CAPSULE ORAL 2 TIMES DAILY
Qty: 20 CAPSULE | Refills: 0 | Status: SHIPPED | OUTPATIENT
Start: 2018-07-09 | End: 2018-07-26

## 2018-07-09 RX ORDER — FLUTICASONE PROPIONATE 50 MCG
2 SPRAY, SUSPENSION (ML) NASAL DAILY
Qty: 1 BOTTLE | Refills: 0 | Status: SHIPPED | OUTPATIENT
Start: 2018-07-09 | End: 2018-07-26

## 2018-07-09 RX ORDER — TRIAMCINOLONE ACETONIDE 40 MG/ML
80 INJECTION, SUSPENSION INTRA-ARTICULAR; INTRAMUSCULAR ONCE
Status: COMPLETED | OUTPATIENT
Start: 2018-07-09 | End: 2018-07-09

## 2018-07-09 RX ORDER — LORATADINE 10 MG/1
10 TABLET ORAL DAILY
Qty: 30 TABLET | Refills: 0 | Status: SHIPPED | OUTPATIENT
Start: 2018-07-09 | End: 2018-08-31 | Stop reason: ALTCHOICE

## 2018-07-09 RX ADMIN — TRIAMCINOLONE ACETONIDE 80 MG: 40 INJECTION, SUSPENSION INTRA-ARTICULAR; INTRAMUSCULAR at 11:12

## 2018-07-09 NOTE — PROGRESS NOTES
Subjective   Annika Kimball is a 82 y.o. female. Patient here today with complaints of Sinusitis  Patient here today with complaints of ear congestion, eyes red and itching, nasal congestion and sneezing.  Symptoms present ×1 week.  She continues to smoke.  Has history of hyperlipidemia, hypertension, due for labs.    Vitals:    07/09/18 1056   BP: 122/72   Pulse: 78   Temp: 97.7 °F (36.5 °C)     Past Medical History:   Diagnosis Date   • Acute bronchitis    • Acute sinusitis    • Dizziness    • Dyslipidemia    • Dysuria    • Essential hypertension    • Gastroesophageal reflux disease    • Generalized abdominal pain    • Tobacco user      Sinusitis   This is a new problem. The current episode started in the past 7 days. The problem has been gradually worsening since onset. There has been no fever. The pain is mild. Associated symptoms include congestion, coughing, ear pain, headaches, a hoarse voice, sinus pressure, sneezing and a sore throat. Pertinent negatives include no chills, diaphoresis, neck pain, shortness of breath or swollen glands. Past treatments include nothing. The treatment provided no relief.   Hypertension   This is a chronic problem. The current episode started more than 1 year ago. The problem is unchanged. The problem is controlled. Associated symptoms include headaches. Pertinent negatives include no anxiety, blurred vision, chest pain, malaise/fatigue, neck pain, orthopnea, palpitations, peripheral edema, PND, shortness of breath or sweats. Risk factors for coronary artery disease include smoking/tobacco exposure, post-menopausal state, obesity and dyslipidemia. Past treatments include lifestyle changes and beta blockers. Current antihypertension treatment includes lifestyle changes and beta blockers. The current treatment provides moderate improvement. Compliance problems include exercise and diet.  There is no history of chronic renal disease.   Hyperlipidemia   This is a chronic  problem. The current episode started more than 1 year ago. Exacerbating diseases include obesity. She has no history of chronic renal disease, diabetes, hypothyroidism, liver disease or nephrotic syndrome. Factors aggravating her hyperlipidemia include beta blockers, fatty foods and smoking. Pertinent negatives include no chest pain, focal sensory loss, focal weakness, leg pain, myalgias or shortness of breath. Current antihyperlipidemic treatment includes exercise and diet change. Compliance problems include adherence to exercise, adherence to diet and medication side effects.  Risk factors for coronary artery disease include post-menopausal, obesity, hypertension and dyslipidemia.        The following portions of the patient's history were reviewed and updated as appropriate: allergies, current medications, past family history, past medical history, past social history, past surgical history and problem list.    Review of Systems   Constitutional: Negative.  Negative for chills, diaphoresis and malaise/fatigue.   HENT: Positive for congestion, ear pain, hoarse voice, sinus pressure, sneezing and sore throat.    Eyes: Negative.  Negative for blurred vision.   Respiratory: Positive for cough. Negative for shortness of breath.    Cardiovascular: Negative.  Negative for chest pain, palpitations, orthopnea and PND.   Gastrointestinal: Negative.    Endocrine: Negative.    Genitourinary: Negative.    Musculoskeletal: Negative.  Negative for myalgias and neck pain.   Skin: Negative.    Allergic/Immunologic: Negative.    Neurological: Positive for headaches. Negative for focal weakness.   Hematological: Negative.    Psychiatric/Behavioral: Negative.        Objective   Physical Exam   Constitutional: She is oriented to person, place, and time. She appears well-developed and well-nourished. No distress.   HENT:   Head: Normocephalic and atraumatic.   Right Ear: Hearing, external ear and ear canal normal. Tympanic membrane is  bulging.   Left Ear: Hearing, external ear and ear canal normal. Tympanic membrane is bulging.   Nose: Right sinus exhibits maxillary sinus tenderness and frontal sinus tenderness. Left sinus exhibits maxillary sinus tenderness and frontal sinus tenderness.   Mouth/Throat: Uvula is midline and mucous membranes are normal. Posterior oropharyngeal erythema present. No tonsillar exudate.   Neck: Neck supple.   Cardiovascular: Normal rate, regular rhythm and normal heart sounds.  Exam reveals no gallop and no friction rub.    No murmur heard.  Pulmonary/Chest: Effort normal and breath sounds normal. No respiratory distress. She has no wheezes. She has no rales.   Lymphadenopathy:     She has no cervical adenopathy.   Neurological: She is alert and oriented to person, place, and time.   Skin: Skin is warm and dry. No rash noted. She is not diaphoretic. No erythema. No pallor.   Psychiatric: She has a normal mood and affect. Her behavior is normal.   Nursing note and vitals reviewed.      Assessment/Plan   Annika was seen today for sinusitis.    Diagnoses and all orders for this visit:    Acute non-recurrent maxillary sinusitis  -     triamcinolone acetonide (KENALOG-40) injection 80 mg; Inject 2 mL into the shoulder, thigh, or buttocks 1 (One) Time.    Acute non-recurrent frontal sinusitis  -     triamcinolone acetonide (KENALOG-40) injection 80 mg; Inject 2 mL into the shoulder, thigh, or buttocks 1 (One) Time.    Essential hypertension  -     CBC & Differential; Future  -     Comprehensive metabolic panel; Future  -     Lipid panel; Future  -     TSH; Future    Tobacco user    Dyslipidemia  -     CBC & Differential; Future  -     Comprehensive metabolic panel; Future  -     Lipid panel; Future  -     TSH; Future    Other orders  -     fluticasone (FLONASE) 50 MCG/ACT nasal spray; 2 sprays into each nostril Daily.  -     loratadine (CLARITIN) 10 MG tablet; Take 1 tablet by mouth Daily.  -     cefdinir (OMNICEF) 300 MG  capsule; Take 1 capsule by mouth 2 (Two) Times a Day.    She's given Kenalog injection IM in office today, Omnicef, Claritin, Flonase as above   If her symptoms should persist or worsen she will return to clinic for follow-up.    Labs will be obtained as above and she'll be informed of results.  She is fasting today   No refills needed today   Follow up 6 months or sooner if necessary   She is aware and is in agreement to this plan   All questions and concerns are addressed with understanding noted  Advised to stop smoking  Patient's Body mass index is 27.71 kg/m². BMI is overweight/obese.  Ready to quit: No  Counseling given: yes

## 2018-07-26 ENCOUNTER — LAB (OUTPATIENT)
Dept: LAB | Facility: OTHER | Age: 83
End: 2018-07-26

## 2018-07-26 ENCOUNTER — OFFICE VISIT (OUTPATIENT)
Dept: FAMILY MEDICINE CLINIC | Facility: CLINIC | Age: 83
End: 2018-07-26

## 2018-07-26 VITALS
WEIGHT: 147 LBS | HEART RATE: 89 BPM | TEMPERATURE: 97.2 F | HEIGHT: 63 IN | BODY MASS INDEX: 26.05 KG/M2 | SYSTOLIC BLOOD PRESSURE: 122 MMHG | DIASTOLIC BLOOD PRESSURE: 70 MMHG

## 2018-07-26 DIAGNOSIS — R10.84 GENERALIZED ABDOMINAL PAIN: ICD-10-CM

## 2018-07-26 DIAGNOSIS — R19.7 DIARRHEA, UNSPECIFIED TYPE: ICD-10-CM

## 2018-07-26 DIAGNOSIS — R19.7 DIARRHEA, UNSPECIFIED TYPE: Primary | ICD-10-CM

## 2018-07-26 LAB
ALBUMIN SERPL-MCNC: 4.2 G/DL (ref 3.5–5)
ALBUMIN/GLOB SERPL: 1.6 G/DL (ref 1.1–1.8)
ALP SERPL-CCNC: 66 U/L (ref 38–126)
ALT SERPL W P-5'-P-CCNC: 14 U/L
ANION GAP SERPL CALCULATED.3IONS-SCNC: 16 MMOL/L (ref 5–15)
AST SERPL-CCNC: 23 U/L (ref 14–36)
BASOPHILS # BLD AUTO: 0.01 10*3/MM3 (ref 0–0.2)
BASOPHILS NFR BLD AUTO: 0.1 % (ref 0–2)
BILIRUB SERPL-MCNC: 0.7 MG/DL (ref 0.2–1.3)
BUN BLD-MCNC: 8 MG/DL (ref 7–17)
BUN/CREAT SERPL: 8 (ref 7–25)
CALCIUM SPEC-SCNC: 9.5 MG/DL (ref 8.4–10.2)
CHLORIDE SERPL-SCNC: 101 MMOL/L (ref 98–107)
CO2 SERPL-SCNC: 21 MMOL/L (ref 22–30)
CREAT BLD-MCNC: 1 MG/DL (ref 0.52–1.04)
DEPRECATED RDW RBC AUTO: 42.3 FL (ref 36.4–46.3)
EOSINOPHIL # BLD AUTO: 0.21 10*3/MM3 (ref 0–0.7)
EOSINOPHIL NFR BLD AUTO: 3 % (ref 0–7)
ERYTHROCYTE [DISTWIDTH] IN BLOOD BY AUTOMATED COUNT: 12.6 % (ref 11.5–14.5)
GFR SERPL CREATININE-BSD FRML MDRD: 53 ML/MIN/1.73 (ref 39–90)
GLOBULIN UR ELPH-MCNC: 2.6 GM/DL (ref 2.3–3.5)
GLUCOSE BLD-MCNC: 101 MG/DL (ref 74–99)
HCT VFR BLD AUTO: 42.6 % (ref 35–45)
HGB BLD-MCNC: 14.3 G/DL (ref 12–15.5)
LYMPHOCYTES # BLD AUTO: 1.48 10*3/MM3 (ref 0.6–4.2)
LYMPHOCYTES NFR BLD AUTO: 21.3 % (ref 10–50)
MCH RBC QN AUTO: 31.4 PG (ref 26.5–34)
MCHC RBC AUTO-ENTMCNC: 33.6 G/DL (ref 31.4–36)
MCV RBC AUTO: 93.6 FL (ref 80–98)
MONOCYTES # BLD AUTO: 0.87 10*3/MM3 (ref 0–0.9)
MONOCYTES NFR BLD AUTO: 12.5 % (ref 0–12)
NEUTROPHILS # BLD AUTO: 4.38 10*3/MM3 (ref 2–8.6)
NEUTROPHILS NFR BLD AUTO: 63.1 % (ref 37–80)
PLATELET # BLD AUTO: 284 10*3/MM3 (ref 150–450)
PMV BLD AUTO: 9.1 FL (ref 8–12)
POTASSIUM BLD-SCNC: 3.6 MMOL/L (ref 3.4–5)
PROT SERPL-MCNC: 6.8 G/DL (ref 6.3–8.2)
RBC # BLD AUTO: 4.55 10*6/MM3 (ref 3.77–5.16)
SODIUM BLD-SCNC: 138 MMOL/L (ref 137–145)
WBC NRBC COR # BLD: 6.95 10*3/MM3 (ref 3.2–9.8)

## 2018-07-26 PROCEDURE — 80053 COMPREHEN METABOLIC PANEL: CPT | Performed by: NURSE PRACTITIONER

## 2018-07-26 PROCEDURE — 99214 OFFICE O/P EST MOD 30 MIN: CPT | Performed by: NURSE PRACTITIONER

## 2018-07-26 PROCEDURE — 85025 COMPLETE CBC W/AUTO DIFF WBC: CPT | Performed by: NURSE PRACTITIONER

## 2018-07-26 PROCEDURE — 36415 COLL VENOUS BLD VENIPUNCTURE: CPT | Performed by: NURSE PRACTITIONER

## 2018-07-31 NOTE — PROGRESS NOTES
Subjective   Annika Kimball is a 82 y.o. female. Patient here today with complaints of Diverticulitis  Patient here today with daughter complaining still of abdominal pain and diarrhea with abdominal cramping.  She reports watery stools but denies blood in her stools.  Symptoms present ×4 days.  [facility] and was started on Flagyl and symptoms have improved slightly since then.  Was recently on antibiotics for sinusitis, Omnicef.    Vitals:    07/26/18 0924   BP: 122/70   Pulse: 89   Temp: 97.2 °F (36.2 °C)     Past Medical History:   Diagnosis Date   • Acute bronchitis    • Acute sinusitis    • Dizziness    • Dyslipidemia    • Dysuria    • Essential hypertension    • Gastroesophageal reflux disease    • Generalized abdominal pain    • Tobacco user      Abdominal Cramping   This is a new problem. The current episode started in the past 7 days. The onset quality is sudden. The problem occurs constantly. The problem has been gradually improving. The pain is located in the generalized abdominal region. The pain is at a severity of 8/10. The pain is moderate. The quality of the pain is aching and cramping. The abdominal pain does not radiate. Associated symptoms include diarrhea, flatus and weight loss. Pertinent negatives include no arthralgias, belching, constipation, dysuria, fever, frequency, headaches, hematochezia, hematuria, melena, myalgias, nausea or vomiting. The pain is aggravated by eating. The pain is relieved by nothing. Treatments tried: flagyl per [facility]. The treatment provided mild relief.   Diarrhea    This is a new problem. The current episode started in the past 7 days. The problem occurs 2 to 4 times per day. The problem has been gradually improving. The stool consistency is described as watery. Associated symptoms include bloating, increased flatus and weight loss. Pertinent negatives include no arthralgias, chills, coughing, fever, headaches, myalgias, sweats, URI or  vomiting. Abdominal pain: abd cramping. Risk factors include recent antibiotic use. She has tried increased fluids and change of diet for the symptoms. The treatment provided mild relief.        The following portions of the patient's history were reviewed and updated as appropriate: allergies, current medications, past family history, past medical history, past social history, past surgical history and problem list.    Review of Systems   Constitutional: Positive for weight loss. Negative for chills and fever.   HENT: Negative.    Eyes: Negative.    Respiratory: Negative.  Negative for cough.    Cardiovascular: Negative.    Gastrointestinal: Positive for bloating, diarrhea and flatus. Negative for constipation, hematochezia, melena, nausea and vomiting. Abdominal pain: abd cramping.   Endocrine: Negative.    Genitourinary: Negative.  Negative for dysuria, frequency and hematuria.   Musculoskeletal: Negative.  Negative for arthralgias and myalgias.   Skin: Negative.    Allergic/Immunologic: Negative.    Neurological: Negative.  Negative for headaches.   Hematological: Negative.    Psychiatric/Behavioral: Negative.        Objective   Physical Exam   Constitutional: She is oriented to person, place, and time. She appears well-developed and well-nourished. No distress.   HENT:   Head: Normocephalic and atraumatic.   Cardiovascular: Normal rate, regular rhythm and normal heart sounds.  Exam reveals no gallop and no friction rub.    No murmur heard.  Pulmonary/Chest: Effort normal and breath sounds normal. No respiratory distress. She has no wheezes. She has no rales.   Abdominal: Soft. Bowel sounds are normal. She exhibits no distension and no mass. There is tenderness (min tenderness throughout). There is no rebound and no guarding. No hernia.   Neurological: She is alert and oriented to person, place, and time.   Skin: Skin is warm and dry. No rash noted. She is not diaphoretic. No erythema. No pallor.   Psychiatric:  She has a normal mood and affect. Her behavior is normal.   Nursing note and vitals reviewed.      Assessment/Plan   Annika was seen today for diverticulitis.    Diagnoses and all orders for this visit:    Diarrhea, unspecified type  -     XR Abdomen Flat & Upright  -     Gastrointestinal Panel, PCR - Stool, Per Rectum; Future  -     CBC & Differential; Future  -     Comprehensive metabolic panel; Future    Generalized abdominal pain  -     XR Abdomen Flat & Upright  -     Gastrointestinal Panel, PCR - Stool, Per Rectum; Future  -     CBC & Differential; Future  -     Comprehensive metabolic panel; Future    I will obtain abdominal x-ray, stool studies and CBC and CMP.  She'll be informed of results.  At present continue on Flagyl until gone and use brat diet, increasing fluids and monitoring her intake and output of fluids.  They are aware and oriented agreement to this plan.  Certainly, if her symptoms should persist or worsen she will either follow up here, ER, urgent care.  They are aware.  All questions and concerns are addressed with understanding noted. Patient's Body mass index is 26.04 kg/m². BMI is overweight.

## 2018-08-02 ENCOUNTER — LAB (OUTPATIENT)
Dept: LAB | Facility: OTHER | Age: 83
End: 2018-08-02

## 2018-08-02 ENCOUNTER — OFFICE VISIT (OUTPATIENT)
Dept: FAMILY MEDICINE CLINIC | Facility: CLINIC | Age: 83
End: 2018-08-02

## 2018-08-02 VITALS
WEIGHT: 147 LBS | DIASTOLIC BLOOD PRESSURE: 80 MMHG | BODY MASS INDEX: 26.05 KG/M2 | TEMPERATURE: 98 F | HEART RATE: 96 BPM | SYSTOLIC BLOOD PRESSURE: 122 MMHG | HEIGHT: 63 IN

## 2018-08-02 DIAGNOSIS — R10.13 EPIGASTRIC PAIN: Primary | ICD-10-CM

## 2018-08-02 DIAGNOSIS — R10.13 EPIGASTRIC PAIN: ICD-10-CM

## 2018-08-02 DIAGNOSIS — R19.7 DIARRHEA, UNSPECIFIED TYPE: ICD-10-CM

## 2018-08-02 LAB
AMYLASE SERPL-CCNC: 66 U/L (ref 30–110)
LIPASE SERPL-CCNC: 160 U/L (ref 23–300)

## 2018-08-02 PROCEDURE — 83690 ASSAY OF LIPASE: CPT | Performed by: NURSE PRACTITIONER

## 2018-08-02 PROCEDURE — 86677 HELICOBACTER PYLORI ANTIBODY: CPT | Performed by: NURSE PRACTITIONER

## 2018-08-02 PROCEDURE — 99214 OFFICE O/P EST MOD 30 MIN: CPT | Performed by: NURSE PRACTITIONER

## 2018-08-02 PROCEDURE — 82150 ASSAY OF AMYLASE: CPT | Performed by: NURSE PRACTITIONER

## 2018-08-02 PROCEDURE — 36415 COLL VENOUS BLD VENIPUNCTURE: CPT | Performed by: NURSE PRACTITIONER

## 2018-08-02 RX ORDER — ESOMEPRAZOLE MAGNESIUM 40 MG/1
40 CAPSULE, DELAYED RELEASE ORAL
Qty: 30 CAPSULE | Refills: 2 | Status: SHIPPED | OUTPATIENT
Start: 2018-08-02 | End: 2019-07-10

## 2018-08-02 NOTE — PROGRESS NOTES
Subjective   Annika Kimball is a 82 y.o. female. Patient here today with complaints of Diarrhea  pt here today with daughter complaining still of epigastric pain , worse after eating anything. Able to drink however. States good I&O. Denies fever. Diarrhea has resolved. [facility] seen [facility] when symptoms began, placed on flagyl . Has finished this prescription. Denies nausea, vomiting. Reports when had same symptoms approx 10-15 years ago was given nexium and symptoms resolved. Does report GERD, reflux now. Reports decreased appetite    Vitals:    08/02/18 1048   BP: 122/80   Pulse: 96   Temp: 98 °F (36.7 °C)     Past Medical History:   Diagnosis Date   • Acute bronchitis    • Acute sinusitis    • Dizziness    • Dyslipidemia    • Dysuria    • Essential hypertension    • Gastroesophageal reflux disease    • Generalized abdominal pain    • Tobacco user      Abdominal Pain   This is a new problem. The current episode started 1 to 4 weeks ago. The onset quality is undetermined. The problem occurs intermittently. The problem has been waxing and waning. The pain is located in the epigastric region. The pain is at a severity of 5/10. The pain is moderate. The quality of the pain is aching. The abdominal pain does not radiate. Associated symptoms include anorexia. Pertinent negatives include no arthralgias, belching, constipation, diarrhea, dysuria, fever, flatus, frequency, headaches, hematochezia, hematuria, melena, myalgias, nausea, vomiting or weight loss. The pain is aggravated by eating. The pain is relieved by nothing. She has tried nothing for the symptoms.        The following portions of the patient's history were reviewed and updated as appropriate: allergies, current medications, past family history, past medical history, past social history, past surgical history and problem list.    Review of Systems   Constitutional: Negative.  Negative for fever and weight loss.   HENT: Negative.    Eyes: Negative.     Respiratory: Negative.    Cardiovascular: Negative.    Gastrointestinal: Positive for abdominal pain and anorexia. Negative for constipation, diarrhea, flatus, hematochezia, melena, nausea and vomiting.   Endocrine: Negative.    Genitourinary: Negative.  Negative for dysuria, frequency and hematuria.   Musculoskeletal: Negative.  Negative for arthralgias and myalgias.   Skin: Negative.    Allergic/Immunologic: Negative.    Neurological: Negative.  Negative for headaches.   Hematological: Negative.    Psychiatric/Behavioral: Negative.        Objective   Physical Exam   Constitutional: She is oriented to person, place, and time. She appears well-developed and well-nourished. No distress.   HENT:   Head: Normocephalic and atraumatic.   Cardiovascular: Normal rate, regular rhythm and normal heart sounds.  Exam reveals no gallop and no friction rub.    No murmur heard.  Pulmonary/Chest: Effort normal and breath sounds normal. No respiratory distress. She has no wheezes. She has no rales.   Abdominal: Soft. Bowel sounds are normal. She exhibits no distension and no mass. There is tenderness in the epigastric area. There is no rigidity, no rebound, no guarding, no CVA tenderness, no tenderness at McBurney's point and negative Hernandez's sign. No hernia.       Neurological: She is alert and oriented to person, place, and time.   Skin: Skin is warm and dry. No rash noted. She is not diaphoretic. No erythema. No pallor.   Psychiatric: She has a normal mood and affect. Her behavior is normal.   Nursing note and vitals reviewed.      Assessment/Plan   Annika was seen today for diarrhea.    Diagnoses and all orders for this visit:    Epigastric pain  Comments:  worse after eating  Orders:  -     Helicobacter Pylori, IgA IgG IgM; Future  -     Amylase; Future  -     Lipase; Future  -     CT Abdomen Pelvis With Contrast    Diarrhea, unspecified type  Comments:  resolved      Other orders  -     esomeprazole (NEXIUM) 40 MG  capsule; Take 1 capsule by mouth Every Morning Before Breakfast.    She started on Nexium as above and I will obtain H. pylori, amylase and lipase as well as CT abdomen and pelvis with contrast.  CBC and CMP have been obtained recently, last week.  Advised to return here if her symptoms should persist or worsen prior to test results.  Otherwise she'll be informed of results of all of the above and treated or referred as necessary.  She is aware and is in agreement to this plan as his daughter.  All questions and concerns are addressed with understanding noted. Patient's Body mass index is 26.04 kg/m². BMI is overweight. Previous records are reviewed including recent lab results.

## 2018-08-07 LAB
H PYLORI IGA SER IA-ACNC: <9 UNITS (ref 0–8.9)
H PYLORI IGG SER IA-ACNC: 0.17 INDEX VALUE (ref 0–0.79)
H PYLORI IGM SER-ACNC: <9 UNITS (ref 0–8.9)

## 2018-08-09 RX ORDER — CIPROFLOXACIN 500 MG/1
500 TABLET, FILM COATED ORAL EVERY 12 HOURS SCHEDULED
Qty: 14 TABLET | Refills: 0 | Status: SHIPPED | OUTPATIENT
Start: 2018-08-09 | End: 2018-10-05

## 2018-08-09 RX ORDER — BUSPIRONE HYDROCHLORIDE 10 MG/1
10 TABLET ORAL 2 TIMES DAILY PRN
Qty: 60 TABLET | Refills: 0 | Status: SHIPPED | OUTPATIENT
Start: 2018-08-09 | End: 2018-11-27 | Stop reason: SDUPTHER

## 2018-08-27 DIAGNOSIS — R19.7 DIARRHEA, UNSPECIFIED TYPE: ICD-10-CM

## 2018-08-27 DIAGNOSIS — R10.13 EPIGASTRIC PAIN: Primary | ICD-10-CM

## 2018-08-31 ENCOUNTER — OFFICE VISIT (OUTPATIENT)
Dept: GASTROENTEROLOGY | Facility: CLINIC | Age: 83
End: 2018-08-31

## 2018-08-31 VITALS
SYSTOLIC BLOOD PRESSURE: 132 MMHG | WEIGHT: 137.4 LBS | BODY MASS INDEX: 24.34 KG/M2 | HEIGHT: 63 IN | DIASTOLIC BLOOD PRESSURE: 84 MMHG | HEART RATE: 66 BPM

## 2018-08-31 DIAGNOSIS — R10.10 PAIN OF UPPER ABDOMEN: Primary | ICD-10-CM

## 2018-08-31 DIAGNOSIS — R93.3 ABNORMAL CT SCAN, COLON: ICD-10-CM

## 2018-08-31 DIAGNOSIS — R10.30 LOWER ABDOMINAL PAIN: ICD-10-CM

## 2018-08-31 DIAGNOSIS — R19.7 DIARRHEA, UNSPECIFIED TYPE: ICD-10-CM

## 2018-08-31 PROCEDURE — 99214 OFFICE O/P EST MOD 30 MIN: CPT | Performed by: NURSE PRACTITIONER

## 2018-08-31 RX ORDER — METRONIDAZOLE 500 MG/1
500 TABLET ORAL 3 TIMES DAILY
COMMUNITY
End: 2018-10-05

## 2018-08-31 RX ORDER — DICYCLOMINE HCL 20 MG
20 TABLET ORAL EVERY 6 HOURS
COMMUNITY
End: 2018-10-05

## 2018-08-31 RX ORDER — DEXTROSE AND SODIUM CHLORIDE 5; .45 G/100ML; G/100ML
30 INJECTION, SOLUTION INTRAVENOUS CONTINUOUS PRN
Status: CANCELLED | OUTPATIENT
Start: 2018-09-26

## 2018-08-31 RX ORDER — PREDNISONE 10 MG/1
10 TABLET ORAL DAILY
COMMUNITY
End: 2018-10-05

## 2018-08-31 RX ORDER — SODIUM, POTASSIUM,MAG SULFATES 17.5-3.13G
1 SOLUTION, RECONSTITUTED, ORAL ORAL EVERY 12 HOURS
Qty: 2 BOTTLE | Refills: 0 | Status: SHIPPED | OUTPATIENT
Start: 2018-08-31 | End: 2018-09-26 | Stop reason: HOSPADM

## 2018-09-26 ENCOUNTER — HOSPITAL ENCOUNTER (OUTPATIENT)
Facility: HOSPITAL | Age: 83
Setting detail: HOSPITAL OUTPATIENT SURGERY
Discharge: HOME | End: 2018-09-26
Attending: INTERNAL MEDICINE | Admitting: INTERNAL MEDICINE

## 2018-09-26 ENCOUNTER — ANESTHESIA (OUTPATIENT)
Dept: GASTROENTEROLOGY | Facility: HOSPITAL | Age: 83
End: 2018-09-26

## 2018-09-26 ENCOUNTER — ANESTHESIA EVENT (OUTPATIENT)
Dept: GASTROENTEROLOGY | Facility: HOSPITAL | Age: 83
End: 2018-09-26

## 2018-09-26 VITALS
DIASTOLIC BLOOD PRESSURE: 51 MMHG | TEMPERATURE: 97 F | OXYGEN SATURATION: 93 % | BODY MASS INDEX: 22.37 KG/M2 | RESPIRATION RATE: 18 BRPM | WEIGHT: 126.25 LBS | HEIGHT: 63 IN | HEART RATE: 69 BPM | SYSTOLIC BLOOD PRESSURE: 106 MMHG

## 2018-09-26 DIAGNOSIS — R19.7 DIARRHEA, UNSPECIFIED TYPE: ICD-10-CM

## 2018-09-26 DIAGNOSIS — R10.10 PAIN OF UPPER ABDOMEN: ICD-10-CM

## 2018-09-26 DIAGNOSIS — R10.30 LOWER ABDOMINAL PAIN: ICD-10-CM

## 2018-09-26 PROCEDURE — 88305 TISSUE EXAM BY PATHOLOGIST: CPT | Performed by: PATHOLOGY

## 2018-09-26 PROCEDURE — 25010000002 PHENYLEPHRINE PER 1 ML: Performed by: NURSE ANESTHETIST, CERTIFIED REGISTERED

## 2018-09-26 PROCEDURE — 43239 EGD BIOPSY SINGLE/MULTIPLE: CPT | Performed by: INTERNAL MEDICINE

## 2018-09-26 PROCEDURE — 25010000002 PROPOFOL 10 MG/ML EMULSION: Performed by: NURSE ANESTHETIST, CERTIFIED REGISTERED

## 2018-09-26 PROCEDURE — 25010000002 FENTANYL CITRATE (PF) 100 MCG/2ML SOLUTION: Performed by: NURSE ANESTHETIST, CERTIFIED REGISTERED

## 2018-09-26 PROCEDURE — 88305 TISSUE EXAM BY PATHOLOGIST: CPT | Performed by: INTERNAL MEDICINE

## 2018-09-26 PROCEDURE — 45380 COLONOSCOPY AND BIOPSY: CPT | Performed by: INTERNAL MEDICINE

## 2018-09-26 RX ORDER — PROMETHAZINE HYDROCHLORIDE 25 MG/ML
12.5 INJECTION, SOLUTION INTRAMUSCULAR; INTRAVENOUS ONCE AS NEEDED
Status: DISCONTINUED | OUTPATIENT
Start: 2018-09-26 | End: 2018-09-26 | Stop reason: HOSPADM

## 2018-09-26 RX ORDER — PROMETHAZINE HYDROCHLORIDE 25 MG/1
25 SUPPOSITORY RECTAL ONCE AS NEEDED
Status: DISCONTINUED | OUTPATIENT
Start: 2018-09-26 | End: 2018-09-26 | Stop reason: HOSPADM

## 2018-09-26 RX ORDER — PROMETHAZINE HYDROCHLORIDE 25 MG/1
25 TABLET ORAL ONCE AS NEEDED
Status: DISCONTINUED | OUTPATIENT
Start: 2018-09-26 | End: 2018-09-26 | Stop reason: HOSPADM

## 2018-09-26 RX ORDER — FENTANYL CITRATE 50 UG/ML
INJECTION, SOLUTION INTRAMUSCULAR; INTRAVENOUS AS NEEDED
Status: DISCONTINUED | OUTPATIENT
Start: 2018-09-26 | End: 2018-09-26 | Stop reason: SURG

## 2018-09-26 RX ORDER — DEXTROSE AND SODIUM CHLORIDE 5; .45 G/100ML; G/100ML
30 INJECTION, SOLUTION INTRAVENOUS CONTINUOUS PRN
Status: DISCONTINUED | OUTPATIENT
Start: 2018-09-26 | End: 2018-09-26 | Stop reason: HOSPADM

## 2018-09-26 RX ORDER — DEXAMETHASONE SODIUM PHOSPHATE 4 MG/ML
8 INJECTION, SOLUTION INTRA-ARTICULAR; INTRALESIONAL; INTRAMUSCULAR; INTRAVENOUS; SOFT TISSUE ONCE AS NEEDED
Status: DISCONTINUED | OUTPATIENT
Start: 2018-09-26 | End: 2018-09-26 | Stop reason: HOSPADM

## 2018-09-26 RX ORDER — ONDANSETRON 2 MG/ML
4 INJECTION INTRAMUSCULAR; INTRAVENOUS ONCE AS NEEDED
Status: DISCONTINUED | OUTPATIENT
Start: 2018-09-26 | End: 2018-09-26 | Stop reason: HOSPADM

## 2018-09-26 RX ORDER — PROPOFOL 10 MG/ML
VIAL (ML) INTRAVENOUS AS NEEDED
Status: DISCONTINUED | OUTPATIENT
Start: 2018-09-26 | End: 2018-09-26 | Stop reason: SURG

## 2018-09-26 RX ADMIN — PROPOFOL 30 MG: 10 INJECTION, EMULSION INTRAVENOUS at 17:08

## 2018-09-26 RX ADMIN — PHENYLEPHRINE HYDROCHLORIDE 100 MCG: 10 INJECTION INTRAVENOUS at 17:06

## 2018-09-26 RX ADMIN — FENTANYL CITRATE 50 MCG: 50 INJECTION, SOLUTION INTRAMUSCULAR; INTRAVENOUS at 16:48

## 2018-09-26 RX ADMIN — PROPOFOL 30 MG: 10 INJECTION, EMULSION INTRAVENOUS at 17:05

## 2018-09-26 RX ADMIN — PROPOFOL 50 MG: 10 INJECTION, EMULSION INTRAVENOUS at 16:59

## 2018-09-26 RX ADMIN — PROPOFOL 30 MG: 10 INJECTION, EMULSION INTRAVENOUS at 17:02

## 2018-09-26 RX ADMIN — FENTANYL CITRATE 50 MCG: 50 INJECTION, SOLUTION INTRAMUSCULAR; INTRAVENOUS at 16:52

## 2018-09-26 RX ADMIN — DEXTROSE AND SODIUM CHLORIDE 30 ML/HR: 5; 450 INJECTION, SOLUTION INTRAVENOUS at 16:30

## 2018-09-26 NOTE — ANESTHESIA POSTPROCEDURE EVALUATION
Patient: Annika Kimball    Procedure Summary     Date:  09/26/18 Room / Location:  [facility] END[facility]COPY 1 / [facility]    Anesthesia Start:  1647 Anesthesia Stop:  1717    Procedures:       ESOPHAGOGASTRODUODENOSCOPY (N/A )      COLONOSCOPY (N/A ) Diagnosis:       Lower abdominal pain      Pain of upper abdomen      Diarrhea, unspecified type      (Lower abdominal pain [R10.30])      (Pain of upper abdomen [R10.10])      (Diarrhea, unspecified type [R19.7])    Surgeon:  Migel Gaston MD Provider:  Florinda Jackson CRNA    Anesthesia Type:  MAC ASA Status:  3          Anesthesia Type: MAC  Last vitals  BP   150/74 (09/26/18 1621)   Temp   97 °F (36.1 °C) (09/26/18 1621)   Pulse   84 (09/26/18 1621)   Resp   20 (09/26/18 1621)     SpO2   96 % (09/26/18 1621)     Post Anesthesia Care and Evaluation    Patient location during evaluation: bedside  Patient participation: complete - patient participated  Level of consciousness: awake and awake and alert  Pain management: adequate  Airway patency: patent  Anesthetic complications: No anesthetic complications  PONV Status: none  Cardiovascular status: acceptable  Respiratory status: acceptable  Hydration status: acceptable

## 2018-09-26 NOTE — ANESTHESIA PREPROCEDURE EVALUATION
Anesthesia Evaluation     Patient summary reviewed and Nursing notes reviewed   NPO Solid Status: > 8 hours  NPO Liquid Status: > 4 hours           Airway   Mallampati: II  TM distance: >3 FB  Neck ROM: full  Possible difficult intubation  Dental    (+) upper dentures and partials    Pulmonary - normal exam   (+) a smoker Current Smoked day of surgery,   Cardiovascular - normal exam    (+) hypertension, CHF, hyperlipidemia,       Neuro/Psych  (+) dizziness/light headedness,     GI/Hepatic/Renal/Endo    (+)  GERD,      Musculoskeletal (-) negative ROS    Abdominal  - normal exam   Substance History - negative use     OB/GYN negative ob/gyn ROS         Other - negative ROS                       Anesthesia Plan    ASA 3     MAC     Anesthetic plan, all risks, benefits, and alternatives have been provided, discussed and informed consent has been obtained with: patient and child.

## 2018-09-29 LAB
LAB AP CASE REPORT: NORMAL
PATH REPORT.FINAL DX SPEC: NORMAL
PATH REPORT.GROSS SPEC: NORMAL

## 2018-10-05 ENCOUNTER — OFFICE VISIT (OUTPATIENT)
Dept: GASTROENTEROLOGY | Facility: CLINIC | Age: 83
End: 2018-10-05

## 2018-10-05 VITALS
WEIGHT: 133 LBS | BODY MASS INDEX: 23.57 KG/M2 | SYSTOLIC BLOOD PRESSURE: 126 MMHG | OXYGEN SATURATION: 95 % | DIASTOLIC BLOOD PRESSURE: 82 MMHG | HEART RATE: 94 BPM

## 2018-10-05 DIAGNOSIS — K29.30 CHRONIC SUPERFICIAL GASTRITIS, PRESENCE OF BLEEDING UNSPECIFIED: ICD-10-CM

## 2018-10-05 DIAGNOSIS — K52.9 COLITIS: Primary | ICD-10-CM

## 2018-10-05 DIAGNOSIS — K21.00 GASTROESOPHAGEAL REFLUX DISEASE WITH ESOPHAGITIS: ICD-10-CM

## 2018-10-05 PROCEDURE — 99214 OFFICE O/P EST MOD 30 MIN: CPT | Performed by: NURSE PRACTITIONER

## 2018-10-05 RX ORDER — MESALAMINE 0.38 G/1
1500 CAPSULE, EXTENDED RELEASE ORAL DAILY
Qty: 120 CAPSULE | Refills: 2 | Status: SHIPPED | OUTPATIENT
Start: 2018-10-05 | End: 2018-11-30

## 2018-10-05 NOTE — PATIENT INSTRUCTIONS

## 2018-10-05 NOTE — PROGRESS NOTES
Chief Complaint   Patient presents with   • Diarrhea   • Abdominal Pain       Subjective    Annika Vee Kimball is a 82 y.o. female. she is here today for follow-up.    History of Present Illness  82-year-old female presents to discuss abdominal pain EGD and colonoscopy follow-ups.  States abdominal pain and bowel habits have slightly gotten better though have changed.  Abdominal pain is more in the upper abdomen associated with oral intake and she has been able to tolerate noodles and tomato sauce frequently states she still is hungry but is afraid to eat due to nausea vomiting occurred previously with eating.  EGD noted mildly severe esophagitis, gastritis and normal duodenum.  Antral biopsy notes mild chronic gastritis, negative for H. pylori.  Distal esophagus biopsy noted segments of mildly inflamed stratified squamous epithelium.  Colonoscopy noted large area of moderately erythematous mucosa throughout the entire colon.  Biopsies were taken which noted a mild nonspecific colitis with no evidence of crypt distortion.  Patient has been treated with antibiotics and steroid in August.  She underwent CT when symptoms started which noted possible early diverticulitis.  She had lost 20 pounds however her weight is up 7 pounds since last visit.  Plan; we'll start patient on mesalamine daily for colitis.  Have discussed case with Dr. Gaston.  Recommend she take her Nexium daily states she has not been taking it recently.  Follow-up in 6 weeks return to office sooner if needed.       The following portions of the patient's history were reviewed and updated as appropriate:   Past Medical History:   Diagnosis Date   • Acute bronchitis    • Acute sinusitis    • Dizziness    • Dyslipidemia    • Dysuria    • Essential hypertension    • Gastroesophageal reflux disease    • Generalized abdominal pain    • Tobacco user      Past Surgical History:   Procedure Laterality Date   • BREAST SURGERY     • COLONOSCOPY N/A 9/26/2018     Procedure: COLONOSCOPY;  Surgeon: Migel Gaston MD;  Location: [facility] ENDOSCOPY;  Service: Gastroenterology   • ENDOSCOPY N/A 9/26/2018    Procedure: ESOPHAGOGASTRODUODENOSCOPY;  Surgeon: Migel Gaston MD;  Location: [facility] ENDOSCOPY;  Service: Gastroenterology   • HAND SURGERY       No family history on file.  OB History     No data available        Current Outpatient Prescriptions   Medication Sig Dispense Refill   • atenolol (TENORMIN) 50 MG tablet TAKE 1 TABLET BY MOUTH DAILY 30 tablet 5   • busPIRone (BUSPAR) 10 MG tablet Take 1 tablet by mouth 2 (Two) Times a Day As Needed (anxiety). 60 tablet 0   • esomeprazole (NEXIUM) 40 MG capsule Take 1 capsule by mouth Every Morning Before Breakfast. 30 capsule 2   • mesalamine (APRISO) 0.375 g 24 hr capsule Take 4 capsules by mouth Daily. 120 capsule 2     No current facility-administered medications for this visit.      Allergies   Allergen Reactions   • Sulfa Antibiotics Hives     Social History     Social History   • Marital status: Single     Social History Main Topics   • Smoking status: Current Every Day Smoker     Packs/day: 1.00     Types: Cigarettes   • Smokeless tobacco: Never Used   • Alcohol use No   • Drug use: No   • Sexual activity: Defer     Other Topics Concern   • Not on file       Review of Systems  Review of Systems   Constitutional: Positive for fatigue. Negative for activity change, appetite change, chills, diaphoresis, fever and unexpected weight change.   HENT: Negative for sore throat and trouble swallowing.    Respiratory: Negative for shortness of breath.    Gastrointestinal: Positive for abdominal pain (some better but still cramping at times ) and diarrhea (less diarrhea now 3 semi formed decreased caliber stool per day. ). Negative for abdominal distention, anal bleeding, blood in stool, constipation, nausea, rectal pain and vomiting.   Musculoskeletal: Negative for arthralgias.   Skin: Negative for pallor.   Neurological: Negative  for light-headedness.        /82 (BP Location: Right arm)   Pulse 94   Wt 60.3 kg (133 lb)   SpO2 95%   BMI 23.57 kg/m²     Objective    Physical Exam   Constitutional: She is oriented to person, place, and time. She appears well-developed and well-nourished. She is cooperative. No distress.   HENT:   Head: Normocephalic and atraumatic.   Neck: Normal range of motion. Neck supple. No thyromegaly present.   Cardiovascular: Normal rate, regular rhythm and normal heart sounds.    Pulmonary/Chest: Effort normal and breath sounds normal. She has no wheezes. She has no rhonchi. She has no rales.   Abdominal: Soft. Normal appearance and bowel sounds are normal. She exhibits no distension. There is no hepatosplenomegaly. There is tenderness in the right upper quadrant, right lower quadrant, epigastric area, left upper quadrant and left lower quadrant. There is no rigidity and no guarding. No hernia.   Lymphadenopathy:     She has no cervical adenopathy.   Neurological: She is alert and oriented to person, place, and time.   Skin: Skin is warm, dry and intact. No rash noted. No pallor.   Psychiatric: She has a normal mood and affect. Her speech is normal.     Admission on 09/26/2018, Discharged on 09/26/2018   Component Date Value Ref Range Status   • Case Report 09/26/2018    Final                    Value:Surgical Pathology Report                         Case: [id number]                                  Authorizing Provider:  Migel Gaston MD        Collected:           09/26/2018 05:06 PM          Ordering Location:     [facility]             Received:            09/27/2018 07:58 AM                                 [facility]                                                     Pathologist:           Jameson Hernandez MD                                                        Specimens:   1) - Gastric, Antrum                                                                                2) -  Esophagus, Distal                                                                              3) - Large Intestine, Right / Ascending Colon                                                       4) - Large Intestine, Transverse Colon                                                              5) - Large Intestine, Sigmoid Colon                                                                                           6) - Large Intestine, Rectum                                                              • Final Diagnosis 09/26/2018    Final                    Value:This result contains rich text formatting which cannot be displayed here.   • Gross Description 09/26/2018    Final                    Value:This result contains rich text formatting which cannot be displayed here.     Assessment/Plan      1. Colitis    2. Gastroesophageal reflux disease with esophagitis    3. Chronic superficial gastritis, presence of bleeding unspecified    .       Orders placed during this encounter include:  No orders of the defined types were placed in this encounter.      * Surgery not found *    Review and/or summary of lab tests, radiology, procedures, medications. Review and summary of old records and obtaining of history. The risks and benefits of my recommendations, as well as other treatment options were discussed with the patient today. Questions were answered.    New Medications Ordered This Visit   Medications   • mesalamine (APRISO) 0.375 g 24 hr capsule     Sig: Take 4 capsules by mouth Daily.     Dispense:  120 capsule     Refill:  2       Follow-up: Return in about 3 months (around 1/5/2019).          This document has been electronically signed by ADRIENNE Hagan on October 5, 2018 12:38 PM             Results for orders placed or performed during the hospital encounter of 09/26/18   Tissue Pathology Exam   Result Value Ref Range    Case Report       Surgical Pathology Report                         Case:  [id number]                                  Authorizing Provider:  Migel Gaston MD        Collected:           09/26/2018 05:06 PM          Ordering Location:     [facility]             Received:            09/27/2018 07:58 AM                                 [facility]                                                     Pathologist:           Jameson Hernandez MD                                                        Specimens:   1) - Gastric, Antrum                                                                                2) - Esophagus, Distal                                                                              3) - Large Intestine, Right / Ascending Colon                                                       4) - Large Intestine, Transverse Colon                                                              5) - Large Intestine, Sigmoid Colon                                                                  6) - Large Intestine, Rectum                                                               Final Diagnosis       1.  GASTRIC ANTRUM, MUCOSAL BIOPSY:   MILD CHRONIC GASTRITIS.   NO EVIDENCE OF HELICOBACTER PYLORI.     2.  DISTAL ESOPHAGUS, MUCOSAL BIOPSY:   SEGMENTS OF MILDLY INFLAMED STRATIFIED SQUAMOUS EPITHELIUM.      3.  ASCENDING COLON, MUCOSAL BIOPSY:   MILD COLITIS, NOS.   NO EVIDENCE OF CRYPT DISTORTION.    4.  TRANSVERSE COLON, MUCOSAL BIOPSY:   MILD COLITIS, NOS.   NO EVIDENCE OF CRYPT DISTORTION.     5.  SIGMOID COLON, MUCOSAL BIOPSY:   MILD COLITIS, NOS.   NO EVIDENCE OF CRYPT DISTORTION.     6.  RECTUM, MUCOSAL BIOPSY:   NO SIGNIFICANT PATHOLOGIC DIAGNOSIS.       Gross Description       In 6 containers, each of these show mucosal biopsies measuring up to 0.3 cm in greatest dimension.  Embedded accordingly:  1A antrum, 2A distal esophagus, 3A ascending colon, 4A transverse colon, 5A sigmoid colon, 6A rectum.        Results for orders placed or performed in visit on  08/02/18   Helicobacter Pylori, IgA IgG IgM   Result Value Ref Range    H. pylori IgG 0.17 0.00 - 0.79 Index Value    H. pylori, IgA ABS <9.0 0.0 - 8.9 units    H. Pylori, IgM <9.0 0.0 - 8.9 units   Lipase   Result Value Ref Range    Lipase 160 23 - 300 U/L   Amylase   Result Value Ref Range    Amylase 66 30 - 110 U/L   Results for orders placed or performed in visit on 07/26/18   CBC Auto Differential   Result Value Ref Range    WBC 6.95 3.20 - 9.80 10*3/mm3    RBC 4.55 3.77 - 5.16 10*6/mm3    Hemoglobin 14.3 12.0 - 15.5 g/dL    Hematocrit 42.6 35.0 - 45.0 %    MCV 93.6 80.0 - 98.0 fL    MCH 31.4 26.5 - 34.0 pg    MCHC 33.6 31.4 - 36.0 g/dL    RDW 12.6 11.5 - 14.5 %    RDW-SD 42.3 36.4 - 46.3 fl    MPV 9.1 8.0 - 12.0 fL    Platelets 284 150 - 450 10*3/mm3    Neutrophil % 63.1 37.0 - 80.0 %    Lymphocyte % 21.3 10.0 - 50.0 %    Monocyte % 12.5 (H) 0.0 - 12.0 %    Eosinophil % 3.0 0.0 - 7.0 %    Basophil % 0.1 0.0 - 2.0 %    Neutrophils, Absolute 4.38 2.00 - 8.60 10*3/mm3    Lymphocytes, Absolute 1.48 0.60 - 4.20 10*3/mm3    Monocytes, Absolute 0.87 0.00 - 0.90 10*3/mm3    Eosinophils, Absolute 0.21 0.00 - 0.70 10*3/mm3    Basophils, Absolute 0.01 0.00 - 0.20 10*3/mm3   Comprehensive metabolic panel   Result Value Ref Range    Glucose 101 (H) 74 - 99 mg/dL    BUN 8 7 - 17 mg/dL    Creatinine 1.00 0.52 - 1.04 mg/dL    Sodium 138 137 - 145 mmol/L    Potassium 3.6 3.4 - 5.0 mmol/L    Chloride 101 98 - 107 mmol/L    CO2 21.0 (L) 22.0 - 30.0 mmol/L    Calcium 9.5 8.4 - 10.2 mg/dL    Total Protein 6.8 6.3 - 8.2 g/dL    Albumin 4.20 3.50 - 5.00 g/dL    ALT (SGPT) 14 <=35 U/L    AST (SGOT) 23 14 - 36 U/L    Alkaline Phosphatase 66 38 - 126 U/L    Total Bilirubin 0.7 0.2 - 1.3 mg/dL    eGFR Non African Amer 53 39 - 90 mL/min/1.73    Globulin 2.6 2.3 - 3.5 gm/dL    A/G Ratio 1.6 1.1 - 1.8 g/dL    BUN/Creatinine Ratio 8.0 7.0 - 25.0    Anion Gap 16.0 (H) 5.0 - 15.0 mmol/L   Results for orders placed or performed in visit on  07/09/18   CBC Auto Differential   Result Value Ref Range    WBC 6.66 3.20 - 9.80 10*3/mm3    RBC 4.51 3.77 - 5.16 10*6/mm3    Hemoglobin 14.3 12.0 - 15.5 g/dL    Hematocrit 42.9 35.0 - 45.0 %    MCV 95.1 80.0 - 98.0 fL    MCH 31.7 26.5 - 34.0 pg    MCHC 33.3 31.4 - 36.0 g/dL    RDW 12.8 11.5 - 14.5 %    RDW-SD 43.3 36.4 - 46.3 fl    MPV 9.4 8.0 - 12.0 fL    Platelets 280 150 - 450 10*3/mm3    Neutrophil % 52.5 37.0 - 80.0 %    Lymphocyte % 36.9 10.0 - 50.0 %    Monocyte % 8.6 0.0 - 12.0 %    Eosinophil % 1.8 0.0 - 7.0 %    Basophil % 0.2 0.0 - 2.0 %    Neutrophils, Absolute 3.50 2.00 - 8.60 10*3/mm3    Lymphocytes, Absolute 2.46 0.60 - 4.20 10*3/mm3    Monocytes, Absolute 0.57 0.00 - 0.90 10*3/mm3    Eosinophils, Absolute 0.12 0.00 - 0.70 10*3/mm3    Basophils, Absolute 0.01 0.00 - 0.20 10*3/mm3   TSH   Result Value Ref Range    TSH 3.310 0.460 - 4.680 mIU/mL   Hemoglobin A1c   Result Value Ref Range    Hemoglobin A1C 6.0 (H) 4 - 5.6 %   Lipid panel   Result Value Ref Range    Total Cholesterol 246 (H) 150 - 200 mg/dL    Triglycerides 92 <=150 mg/dL    HDL Cholesterol 68 (H) 40 - 59 mg/dL    LDL Cholesterol  160 (H) <=100 mg/dL    VLDL Cholesterol 18.4 mg/dL    LDL/HDL Ratio 2.35 0.00 - 3.22   Comprehensive metabolic panel   Result Value Ref Range    Glucose 114 (H) 74 - 99 mg/dL    BUN 14 7 - 17 mg/dL    Creatinine 0.99 0.52 - 1.04 mg/dL    Sodium 140 137 - 145 mmol/L    Potassium 4.0 3.4 - 5.0 mmol/L    Chloride 102 98 - 107 mmol/L    CO2 28.0 22.0 - 30.0 mmol/L    Calcium 9.4 8.4 - 10.2 mg/dL    Total Protein 7.4 6.3 - 8.2 g/dL    Albumin 4.50 3.50 - 5.00 g/dL    ALT (SGPT) 16 <=35 U/L    AST (SGOT) 26 14 - 36 U/L    Alkaline Phosphatase 63 38 - 126 U/L    Total Bilirubin 1.0 0.2 - 1.3 mg/dL    eGFR Non African Amer 54 39 - 90 mL/min/1.73    Globulin 2.9 2.3 - 3.5 gm/dL    A/G Ratio 1.6 1.1 - 1.8 g/dL    BUN/Creatinine Ratio 14.1 7.0 - 25.0    Anion Gap 10.0 5.0 - 15.0 mmol/L   Results for orders placed or  performed in visit on 07/11/17   Potassium   Result Value Ref Range    Potassium 4.2 3.4 - 5.4 mmol/L   Results for orders placed or performed in visit on 07/11/17   Urinalysis, Microscopic Only   Result Value Ref Range    RBC, UA 0-2 (A) None Seen /HPF    WBC, UA 6-12 (A) None Seen /HPF    Bacteria, UA 1+ (A) None Seen /HPF    Squamous Epithelial Cells, UA 0-2 None Seen, 0-2 /HPF    Renal Epithelial Cells, UA 0-2 0 - 2 /HPF    Hyaline Casts, UA None Seen None Seen /LPF    Methodology Manual Light Microscopy      *Note: Due to a large number of results and/or encounters for the requested time period, some results have not been displayed. A complete set of results can be found in Results Review.

## 2018-10-29 ENCOUNTER — OFFICE VISIT (OUTPATIENT)
Dept: FAMILY MEDICINE CLINIC | Facility: CLINIC | Age: 83
End: 2018-10-29

## 2018-10-29 VITALS
SYSTOLIC BLOOD PRESSURE: 128 MMHG | DIASTOLIC BLOOD PRESSURE: 70 MMHG | OXYGEN SATURATION: 98 % | HEART RATE: 98 BPM | TEMPERATURE: 97.4 F | HEIGHT: 63 IN

## 2018-10-29 DIAGNOSIS — M25.552 LEFT HIP PAIN: ICD-10-CM
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
## 2018-10-29 DIAGNOSIS — E53.8 LOW VITAMIN B12 LEVEL: ICD-10-CM

## 2018-10-29 DIAGNOSIS — M79.652 LEFT THIGH PAIN: ICD-10-CM

## 2018-10-29 DIAGNOSIS — Z09 HOSPITAL DISCHARGE FOLLOW-UP: Primary | ICD-10-CM

## 2018-10-29 DIAGNOSIS — Z23 IMMUNIZATION DUE: ICD-10-CM

## 2018-10-29 PROCEDURE — 90662 IIV NO PRSV INCREASED AG IM: CPT | Performed by: NURSE PRACTITIONER

## 2018-10-29 PROCEDURE — G0008 ADMIN INFLUENZA VIRUS VAC: HCPCS | Performed by: NURSE PRACTITIONER

## 2018-10-29 PROCEDURE — 99214 OFFICE O/P EST MOD 30 MIN: CPT | Performed by: NURSE PRACTITIONER

## 2018-10-29 PROCEDURE — 96372 THER/PROPH/DIAG INJ SC/IM: CPT | Performed by: NURSE PRACTITIONER

## 2018-10-29 RX ORDER — CYANOCOBALAMIN 1000 UG/ML
1000 INJECTION, SOLUTION INTRAMUSCULAR; SUBCUTANEOUS
Status: DISCONTINUED | OUTPATIENT
Start: 2018-10-29 | End: 2019-09-04

## 2018-10-29 RX ORDER — BRIMONIDINE TARTRATE/TIMOLOL 0.2%-0.5%
DROPS OPHTHALMIC (EYE)
Refills: 12 | COMMUNITY
Start: 2018-09-13 | End: 2021-05-03

## 2018-10-29 RX ADMIN — CYANOCOBALAMIN 1000 MCG: 1000 INJECTION, SOLUTION INTRAMUSCULAR; SUBCUTANEOUS at 16:33

## 2018-10-30 NOTE — PROGRESS NOTES
Subjective   Annika Kimball is a 83 y.o. female. Patient here today with complaints of Follow-up  Patient here today with daughter complaining that she fell out of her chair 1 week ago and went to [facility] ER where she had CT of head neck and x-rays, informed on negative, however is still complaining of left hip and left thigh pain, patient relates she is able to bear some weight on this leg but not for weight and she is in wheelchair today, daughter relates that she has to assist her with a nearly everything at home now including ambulation.  States that she is able to get to and from bathroom with assistance of walker.  Currently taking probiotics and on Aprisco per GI, continues to have abdominal complaints and weight loss.  Is taking Tylenol when necessary pain which helps some.  Due for B12 injection and flu shot today.    Vitals:    10/29/18 0944   BP: 128/70   Pulse: 98   Temp: 97.4 °F (36.3 °C)   SpO2: 98%     Past Medical History:   Diagnosis Date   • Acute bronchitis    • Acute sinusitis    • Dizziness    • Dyslipidemia    • Dysuria    • Essential hypertension    • Gastroesophageal reflux disease    • Generalized abdominal pain    • Tobacco user      Fall   The accident occurred more than 1 week ago. Fall occurred: From chair. The point of impact was the head, neck and left hip. The pain is present in the left upper leg and left hip. The symptoms are aggravated by ambulation, movement, standing, use of injured limb and pressure on injury. Pertinent negatives include no numbness or tingling. She has tried rest and acetaminophen for the symptoms. The treatment provided mild relief.   Hip Pain    The incident occurred more than 1 week ago. The incident occurred at home. The injury mechanism was a fall and a direct blow. The pain is present in the left hip. The quality of the pain is described as aching. The pain is moderate. The pain has been constant since onset. Associated symptoms include a loss of motion.  Pertinent negatives include no numbness or tingling. She reports no foreign bodies present. The symptoms are aggravated by movement, palpation and weight bearing. She has tried non-weight bearing, rest and acetaminophen for the symptoms. The treatment provided mild relief.        The following portions of the patient's history were reviewed and updated as appropriate: allergies, current medications, past family history, past medical history, past social history, past surgical history and problem list.    Review of Systems   Constitutional: Negative.    HENT: Negative.    Eyes: Negative.    Respiratory: Negative.    Cardiovascular: Negative.    Gastrointestinal: Negative.    Endocrine: Negative.    Genitourinary: Negative.    Musculoskeletal: Positive for arthralgias and gait problem.   Skin: Negative.    Allergic/Immunologic: Negative.    Neurological: Negative for tingling and numbness.   Hematological: Negative.    Psychiatric/Behavioral: Negative.        Objective   Physical Exam   Constitutional: She is oriented to person, place, and time. She appears well-developed and well-nourished. No distress.   HENT:   Head: Normocephalic and atraumatic.   Cardiovascular: Normal rate, regular rhythm and normal heart sounds.  Exam reveals no gallop and no friction rub.    No murmur heard.  Pulmonary/Chest: Effort normal and breath sounds normal. No respiratory distress. She has no wheezes. She has no rales.   Musculoskeletal: She exhibits tenderness.        Left hip: She exhibits decreased range of motion, decreased strength, tenderness and bony tenderness. She exhibits no swelling, no crepitus, no deformity and no laceration.        Left upper leg: She exhibits tenderness and bony tenderness. She exhibits no swelling, no edema, no deformity and no laceration.   She is not ambulatory today, in wheelchair, complaining of pain with palpation to the left lateral hip, anterior left hip, left lateral and proximal thigh.  Reports  increased pain with flexion however exam is limited due to patient in wheelchair today   Neurological: She is alert and oriented to person, place, and time.   Skin: Skin is warm and dry. No rash noted. She is not diaphoretic. No erythema. No pallor.   Psychiatric: She has a normal mood and affect. Her behavior is normal.   Nursing note and vitals reviewed.      Assessment/Plan   Annika was seen today for follow-up.    Diagnoses and all orders for this visit:    Hospital discharge follow-up    Fall, subsequent encounter  -     CT hip left wo contrast  -     XR Femur 2 View Left    Low vitamin B12 level  -     cyanocobalamin injection 1,000 mcg; Inject 1 mL into the appropriate muscle as directed by prescriber Every 28 (Twenty-Eight) Days.    Immunization due    Left hip pain    Left thigh pain    Other orders  -     Flu Vaccine High Dose PF 65YR+ ([id number])    B12 and flu shot given today   Records from [facility] are reviewed, obtained, x-ray and CT results reviewed as well   CT left hip and left femur x-ray will be obtained today, will be informed per phone regarding results   Patient request to continue on Tylenol when necessary pain OTC   Should this not resolve her pain then she or caregiver will call back   Home health physical therapy ordered   She will follow-up here in 2 weeks for recheck or sooner as needed   They are aware and are in agreement to this plan   Continue follow-up with GI and I have encouraged her to monitor weight, increasing calorie content daily.    All questions and concerns are addressed with understanding noted.

## 2018-11-01 RX ORDER — TRAMADOL HYDROCHLORIDE 50 MG/1
50 TABLET ORAL EVERY 6 HOURS PRN
Qty: 40 TABLET | Refills: 0 | Status: SHIPPED | OUTPATIENT
Start: 2018-11-01 | End: 2018-11-27 | Stop reason: SDUPTHER

## 2018-11-05 ENCOUNTER — TELEPHONE (OUTPATIENT)
Dept: FAMILY MEDICINE CLINIC | Facility: CLINIC | Age: 83
End: 2018-11-05

## 2018-11-27 RX ORDER — BUSPIRONE HYDROCHLORIDE 10 MG/1
10 TABLET ORAL 2 TIMES DAILY PRN
Qty: 60 TABLET | Refills: 0 | Status: SHIPPED | OUTPATIENT
Start: 2018-11-27 | End: 2019-02-25 | Stop reason: SDUPTHER

## 2018-11-27 RX ORDER — TRAMADOL HYDROCHLORIDE 50 MG/1
50 TABLET ORAL EVERY 6 HOURS PRN
Qty: 40 TABLET | Refills: 0 | Status: SHIPPED | OUTPATIENT
Start: 2018-11-27 | End: 2019-07-10

## 2018-11-30 ENCOUNTER — OFFICE VISIT (OUTPATIENT)
Dept: GASTROENTEROLOGY | Facility: CLINIC | Age: 83
End: 2018-11-30

## 2018-11-30 VITALS
SYSTOLIC BLOOD PRESSURE: 132 MMHG | BODY MASS INDEX: 21.4 KG/M2 | DIASTOLIC BLOOD PRESSURE: 82 MMHG | HEIGHT: 63 IN | HEART RATE: 98 BPM | OXYGEN SATURATION: 93 % | WEIGHT: 120.8 LBS

## 2018-11-30 DIAGNOSIS — R63.4 WEIGHT LOSS, ABNORMAL: ICD-10-CM

## 2018-11-30 DIAGNOSIS — K52.9 COLITIS: ICD-10-CM

## 2018-11-30 DIAGNOSIS — R63.0 DECREASED APPETITE: ICD-10-CM

## 2018-11-30 DIAGNOSIS — K58.2 IRRITABLE BOWEL SYNDROME WITH BOTH CONSTIPATION AND DIARRHEA: Primary | ICD-10-CM

## 2018-11-30 PROCEDURE — 99213 OFFICE O/P EST LOW 20 MIN: CPT | Performed by: NURSE PRACTITIONER

## 2018-11-30 RX ORDER — MEGESTROL ACETATE 40 MG/ML
400 SUSPENSION ORAL DAILY
Qty: 240 ML | Refills: 1 | Status: SHIPPED | OUTPATIENT
Start: 2018-11-30 | End: 2018-12-28

## 2018-11-30 NOTE — PROGRESS NOTES
Chief Complaint   Patient presents with   • Abdominal Pain   • Diarrhea       Subjective    Annika Vee Kimball is a 83 y.o. female. she is here today for follow-up.    History of Present Illness  83-year-old female presents for follow-up regarding colitis abdominal pain and weight loss.  Patient reports bowel habits were getting better however she had 10 days ago constipation and was given mag citrate which greatly improved her symptoms.  Had a bowel movement yesterday and today denies any melena or hematochezia reports it was normal consistency.  She is down 13 pounds from last office visit reports nothing sounds good.  At last visit we discussed her fear of eating due to abdominal pain associated with eating.  She was previously treated with anabolic steroids in August and we started Apriso however she felt that made her constipation too severe and discontinued it.  States Nexium has helped reflux.  Plan; have discussed referral to nutrition services which patient declines.  Recommend small frequent meals throughout the day will add megestrol low-dose to increase appetite and follow-up in one month return to office sooner if needed.       The following portions of the patient's history were reviewed and updated as appropriate:   Past Medical History:   Diagnosis Date   • Acute bronchitis    • Acute sinusitis    • Dizziness    • Dyslipidemia    • Dysuria    • Essential hypertension    • Gastroesophageal reflux disease    • Generalized abdominal pain    • Tobacco user      Past Surgical History:   Procedure Laterality Date   • BREAST SURGERY     • HAND SURGERY       No family history on file.  OB History     No data available        Current Outpatient Medications   Medication Sig Dispense Refill   • atenolol (TENORMIN) 50 MG tablet TAKE 1 TABLET BY MOUTH DAILY 30 tablet 5   • busPIRone (BUSPAR) 10 MG tablet Take 1 tablet by mouth 2 (Two) Times a Day As Needed (anxiety). 60 tablet 0   • COMBIGAN 0.2-0.5 % ophthalmic  "solution INSTILL ONE DROP IN LEFT EYE TWICE DAILY  12   • esomeprazole (NEXIUM) 40 MG capsule Take 1 capsule by mouth Every Morning Before Breakfast. 30 capsule 2   • traMADol (ULTRAM) 50 MG tablet Take 1 tablet by mouth Every 6 (Six) Hours As Needed for Moderate Pain . 40 tablet 0   • megestrol (MEGACE ORAL) 40 MG/ML suspension Take 10 mL by mouth Daily. 240 mL 1     Current Facility-Administered Medications   Medication Dose Route Frequency Provider Last Rate Last Dose   • cyanocobalamin injection 1,000 mcg  1,000 mcg Intramuscular Q28 Days Stephanie Abdullahi, APRN   1,000 mcg at 10/29/18 1633     Allergies   Allergen Reactions   • Sulfa Antibiotics Hives     Social History     Socioeconomic History   • Marital status: Single     Spouse name: Not on file   • Number of children: Not on file   • Years of education: Not on file   • Highest education level: Not on file   Tobacco Use   • Smoking status: Current Every Day Smoker     Packs/day: 1.00     Types: Cigarettes   • Smokeless tobacco: Never Used   Substance and Sexual Activity   • Alcohol use: No   • Drug use: No   • Sexual activity: Defer       Review of Systems  Review of Systems   Constitutional: Positive for appetite change, fatigue and unexpected weight change (down 13 pounds ). Negative for activity change, chills, diaphoresis and fever.   HENT: Negative for sore throat and trouble swallowing.    Respiratory: Negative for shortness of breath.    Gastrointestinal: Positive for abdominal pain, constipation (recent bm with mag citrate ) and nausea. Negative for abdominal distention, anal bleeding, blood in stool, diarrhea, rectal pain and vomiting.   Musculoskeletal: Negative for arthralgias.   Skin: Negative for pallor.   Neurological: Negative for light-headedness.        /82 (BP Location: Left arm)   Pulse 98   Ht 160 cm (63\")   Wt 54.8 kg (120 lb 12.8 oz)   SpO2 93%   BMI 21.40 kg/m²     Objective    Physical Exam   Constitutional: She is oriented " to person, place, and time. She appears well-developed and well-nourished. She is cooperative. No distress.   HENT:   Head: Normocephalic and atraumatic.   Neck: Normal range of motion. Neck supple. No thyromegaly present.   Cardiovascular: Normal rate, regular rhythm and normal heart sounds.   Pulmonary/Chest: Effort normal and breath sounds normal. She has no wheezes. She has no rhonchi. She has no rales.   Abdominal: Soft. Normal appearance and bowel sounds are normal. She exhibits no shifting dullness, no distension, no fluid wave and no ascites. There is no hepatosplenomegaly. There is tenderness in the epigastric area. There is no rigidity and no guarding. No hernia.   Lymphadenopathy:     She has no cervical adenopathy.   Neurological: She is alert and oriented to person, place, and time.   Skin: Skin is warm, dry and intact. No rash noted. No pallor.   Psychiatric: She has a normal mood and affect. Her speech is normal.     Admission on 09/26/2018, Discharged on 09/26/2018   Component Date Value Ref Range Status   • Case Report 09/26/2018    Final                    Value:Surgical Pathology Report                         Case: [id number]                                  Authorizing Provider:  Migel Gaston MD        Collected:           09/26/2018 05:06 PM          Ordering Location:     [facility]             Received:            09/27/2018 07:58 AM                                 [facility]                                                     Pathologist:           Jameson Hernandez MD                                                        Specimens:   1) - Gastric, Antrum                                                                                2) - Esophagus, Distal                                                                              3) - Large Intestine, Right / Ascending Colon                                                       4) - Large Intestine, Transverse Colon                                                               5) - Large Intestine, Sigmoid Colon                                                                                           6) - Large Intestine, Rectum                                                              • Final Diagnosis 09/26/2018    Final                    Value:This result contains rich text formatting which cannot be displayed here.   • Gross Description 09/26/2018    Final                    Value:This result contains rich text formatting which cannot be displayed here.     Assessment/Plan      1. Irritable bowel syndrome with both constipation and diarrhea    2. Decreased appetite    3. Weight loss, abnormal    4. Colitis    .       Orders placed during this encounter include:  No orders of the defined types were placed in this encounter.      * Surgery not found *    Review and/or summary of lab tests, radiology, procedures, medications. Review and summary of old records and obtaining of history. The risks and benefits of my recommendations, as well as other treatment options were discussed with the patient today. Questions were answered.    New Medications Ordered This Visit   Medications   • megestrol (MEGACE ORAL) 40 MG/ML suspension     Sig: Take 10 mL by mouth Daily.     Dispense:  240 mL     Refill:  1       Follow-up: Return in about 4 weeks (around 12/28/2018).          This document has been electronically signed by ADRIENNE Hagan on November 30, 2018 9:45 AM             Results for orders placed or performed during the hospital encounter of 09/26/18   Tissue Pathology Exam   Result Value Ref Range    Case Report       Surgical Pathology Report                         Case: [id number]                                  Authorizing Provider:  Migel Gaston MD        Collected:           09/26/2018 05:06 PM          Ordering Location:     [facility]             Received:            09/27/2018 07:58 AM                                  [facility]                                                     Pathologist:           Jameson Hernandez MD                                                        Specimens:   1) - Gastric, Antrum                                                                                2) - Esophagus, Distal                                                                              3) - Large Intestine, Right / Ascending Colon                                                       4) - Large Intestine, Transverse Colon                                                              5) - Large Intestine, Sigmoid Colon                                                                  6) - Large Intestine, Rectum                                                               Final Diagnosis       1.  GASTRIC ANTRUM, MUCOSAL BIOPSY:   MILD CHRONIC GASTRITIS.   NO EVIDENCE OF HELICOBACTER PYLORI.     2.  DISTAL ESOPHAGUS, MUCOSAL BIOPSY:   SEGMENTS OF MILDLY INFLAMED STRATIFIED SQUAMOUS EPITHELIUM.      3.  ASCENDING COLON, MUCOSAL BIOPSY:   MILD COLITIS, NOS.   NO EVIDENCE OF CRYPT DISTORTION.    4.  TRANSVERSE COLON, MUCOSAL BIOPSY:   MILD COLITIS, NOS.   NO EVIDENCE OF CRYPT DISTORTION.     5.  SIGMOID COLON, MUCOSAL BIOPSY:   MILD COLITIS, NOS.   NO EVIDENCE OF CRYPT DISTORTION.     6.  RECTUM, MUCOSAL BIOPSY:   NO SIGNIFICANT PATHOLOGIC DIAGNOSIS.       Gross Description       In 6 containers, each of these show mucosal biopsies measuring up to 0.3 cm in greatest dimension.  Embedded accordingly:  1A antrum, 2A distal esophagus, 3A ascending colon, 4A transverse colon, 5A sigmoid colon, 6A rectum.        Results for orders placed or performed in visit on 08/02/18   Helicobacter Pylori, IgA IgG IgM   Result Value Ref Range    H. pylori IgG 0.17 0.00 - 0.79 Index Value    H. pylori, IgA ABS <9.0 0.0 - 8.9 units    H. Pylori, IgM <9.0 0.0 - 8.9 units   Lipase   Result Value Ref Range    Lipase 160 23 - 300  U/L   Amylase   Result Value Ref Range    Amylase 66 30 - 110 U/L   Results for orders placed or performed in visit on 07/26/18   CBC Auto Differential   Result Value Ref Range    WBC 6.95 3.20 - 9.80 10*3/mm3    RBC 4.55 3.77 - 5.16 10*6/mm3    Hemoglobin 14.3 12.0 - 15.5 g/dL    Hematocrit 42.6 35.0 - 45.0 %    MCV 93.6 80.0 - 98.0 fL    MCH 31.4 26.5 - 34.0 pg    MCHC 33.6 31.4 - 36.0 g/dL    RDW 12.6 11.5 - 14.5 %    RDW-SD 42.3 36.4 - 46.3 fl    MPV 9.1 8.0 - 12.0 fL    Platelets 284 150 - 450 10*3/mm3    Neutrophil % 63.1 37.0 - 80.0 %    Lymphocyte % 21.3 10.0 - 50.0 %    Monocyte % 12.5 (H) 0.0 - 12.0 %    Eosinophil % 3.0 0.0 - 7.0 %    Basophil % 0.1 0.0 - 2.0 %    Neutrophils, Absolute 4.38 2.00 - 8.60 10*3/mm3    Lymphocytes, Absolute 1.48 0.60 - 4.20 10*3/mm3    Monocytes, Absolute 0.87 0.00 - 0.90 10*3/mm3    Eosinophils, Absolute 0.21 0.00 - 0.70 10*3/mm3    Basophils, Absolute 0.01 0.00 - 0.20 10*3/mm3   Comprehensive metabolic panel   Result Value Ref Range    Glucose 101 (H) 74 - 99 mg/dL    BUN 8 7 - 17 mg/dL    Creatinine 1.00 0.52 - 1.04 mg/dL    Sodium 138 137 - 145 mmol/L    Potassium 3.6 3.4 - 5.0 mmol/L    Chloride 101 98 - 107 mmol/L    CO2 21.0 (L) 22.0 - 30.0 mmol/L    Calcium 9.5 8.4 - 10.2 mg/dL    Total Protein 6.8 6.3 - 8.2 g/dL    Albumin 4.20 3.50 - 5.00 g/dL    ALT (SGPT) 14 <=35 U/L    AST (SGOT) 23 14 - 36 U/L    Alkaline Phosphatase 66 38 - 126 U/L    Total Bilirubin 0.7 0.2 - 1.3 mg/dL    eGFR Non African Amer 53 39 - 90 mL/min/1.73    Globulin 2.6 2.3 - 3.5 gm/dL    A/G Ratio 1.6 1.1 - 1.8 g/dL    BUN/Creatinine Ratio 8.0 7.0 - 25.0    Anion Gap 16.0 (H) 5.0 - 15.0 mmol/L   Results for orders placed or performed in visit on 07/09/18   CBC Auto Differential   Result Value Ref Range    WBC 6.66 3.20 - 9.80 10*3/mm3    RBC 4.51 3.77 - 5.16 10*6/mm3    Hemoglobin 14.3 12.0 - 15.5 g/dL    Hematocrit 42.9 35.0 - 45.0 %    MCV 95.1 80.0 - 98.0 fL    MCH 31.7 26.5 - 34.0 pg    MCHC  33.3 31.4 - 36.0 g/dL    RDW 12.8 11.5 - 14.5 %    RDW-SD 43.3 36.4 - 46.3 fl    MPV 9.4 8.0 - 12.0 fL    Platelets 280 150 - 450 10*3/mm3    Neutrophil % 52.5 37.0 - 80.0 %    Lymphocyte % 36.9 10.0 - 50.0 %    Monocyte % 8.6 0.0 - 12.0 %    Eosinophil % 1.8 0.0 - 7.0 %    Basophil % 0.2 0.0 - 2.0 %    Neutrophils, Absolute 3.50 2.00 - 8.60 10*3/mm3    Lymphocytes, Absolute 2.46 0.60 - 4.20 10*3/mm3    Monocytes, Absolute 0.57 0.00 - 0.90 10*3/mm3    Eosinophils, Absolute 0.12 0.00 - 0.70 10*3/mm3    Basophils, Absolute 0.01 0.00 - 0.20 10*3/mm3   TSH   Result Value Ref Range    TSH 3.310 0.460 - 4.680 mIU/mL   Hemoglobin A1c   Result Value Ref Range    Hemoglobin A1C 6.0 (H) 4 - 5.6 %   Lipid panel   Result Value Ref Range    Total Cholesterol 246 (H) 150 - 200 mg/dL    Triglycerides 92 <=150 mg/dL    HDL Cholesterol 68 (H) 40 - 59 mg/dL    LDL Cholesterol  160 (H) <=100 mg/dL    VLDL Cholesterol 18.4 mg/dL    LDL/HDL Ratio 2.35 0.00 - 3.22   Comprehensive metabolic panel   Result Value Ref Range    Glucose 114 (H) 74 - 99 mg/dL    BUN 14 7 - 17 mg/dL    Creatinine 0.99 0.52 - 1.04 mg/dL    Sodium 140 137 - 145 mmol/L    Potassium 4.0 3.4 - 5.0 mmol/L    Chloride 102 98 - 107 mmol/L    CO2 28.0 22.0 - 30.0 mmol/L    Calcium 9.4 8.4 - 10.2 mg/dL    Total Protein 7.4 6.3 - 8.2 g/dL    Albumin 4.50 3.50 - 5.00 g/dL    ALT (SGPT) 16 <=35 U/L    AST (SGOT) 26 14 - 36 U/L    Alkaline Phosphatase 63 38 - 126 U/L    Total Bilirubin 1.0 0.2 - 1.3 mg/dL    eGFR Non African Amer 54 39 - 90 mL/min/1.73    Globulin 2.9 2.3 - 3.5 gm/dL    A/G Ratio 1.6 1.1 - 1.8 g/dL    BUN/Creatinine Ratio 14.1 7.0 - 25.0    Anion Gap 10.0 5.0 - 15.0 mmol/L   Results for orders placed or performed in visit on 07/11/17   Potassium   Result Value Ref Range    Potassium 4.2 3.4 - 5.4 mmol/L   Results for orders placed or performed in visit on 07/11/17   Urinalysis, Microscopic Only   Result Value Ref Range    RBC, UA 0-2 (A) None Seen /HPF     WBC, UA 6-12 (A) None Seen /HPF    Bacteria, UA 1+ (A) None Seen /HPF    Squamous Epithelial Cells, UA 0-2 None Seen, 0-2 /HPF    Renal Epithelial Cells, UA 0-2 0 - 2 /HPF    Hyaline Casts, UA None Seen None Seen /LPF    Methodology Manual Light Microscopy      *Note: Due to a large number of results and/or encounters for the requested time period, some results have not been displayed. A complete set of results can be found in Results Review.

## 2018-11-30 NOTE — PATIENT INSTRUCTIONS

## 2018-12-06 ENCOUNTER — TELEPHONE (OUTPATIENT)
Dept: FAMILY MEDICINE CLINIC | Facility: CLINIC | Age: 83
End: 2018-12-06

## 2018-12-28 ENCOUNTER — OFFICE VISIT (OUTPATIENT)
Dept: GASTROENTEROLOGY | Facility: CLINIC | Age: 83
End: 2018-12-28

## 2018-12-28 VITALS
HEIGHT: 63 IN | OXYGEN SATURATION: 95 % | BODY MASS INDEX: 21.44 KG/M2 | HEART RATE: 100 BPM | DIASTOLIC BLOOD PRESSURE: 82 MMHG | WEIGHT: 121 LBS | SYSTOLIC BLOOD PRESSURE: 142 MMHG

## 2018-12-28 DIAGNOSIS — R63.0 DECREASED APPETITE: ICD-10-CM

## 2018-12-28 DIAGNOSIS — K58.2 IRRITABLE BOWEL SYNDROME WITH BOTH CONSTIPATION AND DIARRHEA: Primary | ICD-10-CM

## 2018-12-28 PROCEDURE — 99213 OFFICE O/P EST LOW 20 MIN: CPT | Performed by: NURSE PRACTITIONER

## 2019-01-09 ENCOUNTER — LAB (OUTPATIENT)
Dept: LAB | Facility: OTHER | Age: 84
End: 2019-01-09

## 2019-01-09 ENCOUNTER — OFFICE VISIT (OUTPATIENT)
Dept: FAMILY MEDICINE CLINIC | Facility: CLINIC | Age: 84
End: 2019-01-09

## 2019-01-09 VITALS
WEIGHT: 122.6 LBS | SYSTOLIC BLOOD PRESSURE: 122 MMHG | HEART RATE: 85 BPM | DIASTOLIC BLOOD PRESSURE: 72 MMHG | HEIGHT: 63 IN | TEMPERATURE: 97.4 F | OXYGEN SATURATION: 96 % | BODY MASS INDEX: 21.72 KG/M2

## 2019-01-09 DIAGNOSIS — M79.604 LEG PAIN, BILATERAL: ICD-10-CM

## 2019-01-09 DIAGNOSIS — R22.40 LOCALIZED SWELLING OF LOWER LEG: ICD-10-CM

## 2019-01-09 DIAGNOSIS — Z72.0 TOBACCO USER: Chronic | ICD-10-CM

## 2019-01-09 DIAGNOSIS — M79.605 LEG PAIN, BILATERAL: ICD-10-CM

## 2019-01-09 DIAGNOSIS — M79.605 LEG PAIN, BILATERAL: Primary | ICD-10-CM

## 2019-01-09 DIAGNOSIS — L30.9 DERMATITIS: ICD-10-CM

## 2019-01-09 DIAGNOSIS — R09.89 OTHER SPECIFIED SYMPTOMS AND SIGNS INVOLVING THE CIRCULATORY AND RESPIRATORY SYSTEMS: ICD-10-CM

## 2019-01-09 DIAGNOSIS — I83.893 VARICOSE VEINS OF LEG WITH SWELLING, BILATERAL: ICD-10-CM

## 2019-01-09 DIAGNOSIS — E78.5 DYSLIPIDEMIA: Chronic | ICD-10-CM

## 2019-01-09 DIAGNOSIS — I10 ESSENTIAL HYPERTENSION: Chronic | ICD-10-CM

## 2019-01-09 DIAGNOSIS — M79.604 LEG PAIN, BILATERAL: Primary | ICD-10-CM

## 2019-01-09 LAB
ALBUMIN SERPL-MCNC: 4.2 G/DL (ref 3.5–5)
ALBUMIN/GLOB SERPL: 1.6 G/DL (ref 1.1–1.8)
ALP SERPL-CCNC: 101 U/L (ref 38–126)
ALT SERPL W P-5'-P-CCNC: 19 U/L
ANION GAP SERPL CALCULATED.3IONS-SCNC: 6 MMOL/L (ref 5–15)
AST SERPL-CCNC: 35 U/L (ref 14–36)
BASOPHILS # BLD AUTO: 0.02 10*3/MM3 (ref 0–0.2)
BASOPHILS NFR BLD AUTO: 0.4 % (ref 0–2)
BILIRUB SERPL-MCNC: 0.8 MG/DL (ref 0.2–1.3)
BUN BLD-MCNC: 11 MG/DL (ref 7–17)
BUN/CREAT SERPL: 15.1 (ref 7–25)
CALCIUM SPEC-SCNC: 9.5 MG/DL (ref 8.4–10.2)
CHLORIDE SERPL-SCNC: 102 MMOL/L (ref 98–107)
CO2 SERPL-SCNC: 28 MMOL/L (ref 22–30)
CREAT BLD-MCNC: 0.73 MG/DL (ref 0.52–1.04)
DEPRECATED RDW RBC AUTO: 43.5 FL (ref 36.4–46.3)
EOSINOPHIL # BLD AUTO: 0.37 10*3/MM3 (ref 0–0.7)
EOSINOPHIL NFR BLD AUTO: 7.5 % (ref 0–7)
ERYTHROCYTE [DISTWIDTH] IN BLOOD BY AUTOMATED COUNT: 13.3 % (ref 11.5–14.5)
GFR SERPL CREATININE-BSD FRML MDRD: 76 ML/MIN/1.73 (ref 39–90)
GLOBULIN UR ELPH-MCNC: 2.7 GM/DL (ref 2.3–3.5)
GLUCOSE BLD-MCNC: 116 MG/DL (ref 74–99)
HCT VFR BLD AUTO: 40 % (ref 35–45)
HGB BLD-MCNC: 13.1 G/DL (ref 12–15.5)
LYMPHOCYTES # BLD AUTO: 1.71 10*3/MM3 (ref 0.6–4.2)
LYMPHOCYTES NFR BLD AUTO: 34.6 % (ref 10–50)
MCH RBC QN AUTO: 30 PG (ref 26.5–34)
MCHC RBC AUTO-ENTMCNC: 32.8 G/DL (ref 31.4–36)
MCV RBC AUTO: 91.7 FL (ref 80–98)
MONOCYTES # BLD AUTO: 0.4 10*3/MM3 (ref 0–0.9)
MONOCYTES NFR BLD AUTO: 8.1 % (ref 0–12)
NEUTROPHILS # BLD AUTO: 2.44 10*3/MM3 (ref 2–8.6)
NEUTROPHILS NFR BLD AUTO: 49.4 % (ref 37–80)
PLATELET # BLD AUTO: 280 10*3/MM3 (ref 150–450)
PMV BLD AUTO: 9.3 FL (ref 8–12)
POTASSIUM BLD-SCNC: 4.4 MMOL/L (ref 3.4–5)
PROT SERPL-MCNC: 6.9 G/DL (ref 6.3–8.2)
RBC # BLD AUTO: 4.36 10*6/MM3 (ref 3.77–5.16)
SODIUM BLD-SCNC: 136 MMOL/L (ref 137–145)
WBC NRBC COR # BLD: 4.94 10*3/MM3 (ref 3.2–9.8)

## 2019-01-09 PROCEDURE — 99214 OFFICE O/P EST MOD 30 MIN: CPT | Performed by: NURSE PRACTITIONER

## 2019-01-09 PROCEDURE — 80053 COMPREHEN METABOLIC PANEL: CPT | Performed by: NURSE PRACTITIONER

## 2019-01-09 PROCEDURE — 36415 COLL VENOUS BLD VENIPUNCTURE: CPT | Performed by: NURSE PRACTITIONER

## 2019-01-09 PROCEDURE — 85025 COMPLETE CBC W/AUTO DIFF WBC: CPT | Performed by: NURSE PRACTITIONER

## 2019-01-09 RX ORDER — METHYLPREDNISOLONE 4 MG/1
TABLET ORAL
Qty: 1 EACH | Refills: 0 | Status: SHIPPED | OUTPATIENT
Start: 2019-01-09 | End: 2019-01-16

## 2019-01-09 RX ORDER — FUROSEMIDE 20 MG/1
20 TABLET ORAL DAILY PRN
Qty: 30 TABLET | Refills: 0 | Status: SHIPPED | OUTPATIENT
Start: 2019-01-09 | End: 2019-07-10

## 2019-01-09 RX ORDER — CEPHALEXIN 500 MG/1
500 CAPSULE ORAL 2 TIMES DAILY
Qty: 20 CAPSULE | Refills: 0 | Status: SHIPPED | OUTPATIENT
Start: 2019-01-09 | End: 2019-01-16

## 2019-01-09 NOTE — PROGRESS NOTES
"Subjective   Annika Kimball is a 83 y.o. female. Patient here today with complaints of Leg Pain and Leg Swelling  Patient here today with daughter complaining of bilateral lower extremity pain, swelling, redness, aching and sharp pain at times, also complains of itching rash to same area.  She has history of hypertension, hyperlipidemia, tobacco use and denies desire to quit currently.  States has had x-rays previously but is not sure she's ever had ultrasounds in the past.  Denies chest pain or shortness of breath.  States she has stopped taking all of her medications.  She continues to follow-up with GI, complaining of right ear feeling like \"fluid is in it\".    Vitals:    01/09/19 1416   BP: 122/72   Pulse: 85   Temp: 97.4 °F (36.3 °C)   SpO2: 96%     Past Medical History:   Diagnosis Date   • Acute bronchitis    • Acute sinusitis    • Dizziness    • Dyslipidemia    • Dysuria    • Essential hypertension    • Gastroesophageal reflux disease    • Generalized abdominal pain    • Tobacco user      Leg Pain    The incident occurred more than 1 week ago. There was no injury mechanism. The pain is present in the left leg and right leg. The quality of the pain is described as aching and stabbing. The pain is mild. The pain has been constant since onset. Pertinent negatives include no inability to bear weight, loss of motion, loss of sensation, muscle weakness, numbness or tingling. She reports no foreign bodies present. Nothing aggravates the symptoms. She has tried rest and non-weight bearing for the symptoms. The treatment provided mild relief.   Leg Swelling   This is a recurrent problem. The current episode started more than 1 month ago. The problem occurs constantly. The problem has been waxing and waning. Associated symptoms include a rash. Pertinent negatives include no numbness. The symptoms are aggravated by standing. She has tried rest (Elevation of lower extremities) for the symptoms. The treatment " provided mild (States improved in a.m. after she has been sleeping but progressively worsens throughout the day.) relief.        The following portions of the patient's history were reviewed and updated as appropriate: allergies, current medications, past family history, past medical history, past social history, past surgical history and problem list.    Review of Systems   Constitutional: Negative.    HENT: Negative.    Eyes: Negative.    Respiratory: Negative.    Cardiovascular: Positive for leg swelling.   Gastrointestinal: Positive for diarrhea (Improved).   Endocrine: Negative.    Genitourinary: Negative.    Musculoskeletal: Positive for gait problem.   Skin: Positive for color change and rash.   Allergic/Immunologic: Negative.    Neurological: Negative for tingling and numbness.   Hematological: Negative.    Psychiatric/Behavioral: Negative.        Objective   Physical Exam   Constitutional: She is oriented to person, place, and time. She appears well-developed and well-nourished. No distress.   HENT:   Head: Normocephalic and atraumatic.   Cardiovascular: Normal rate, regular rhythm and normal heart sounds. Exam reveals no gallop and no friction rub.   No murmur heard.  Pulmonary/Chest: Effort normal. No stridor. No respiratory distress. She has decreased breath sounds in the right upper field, the right middle field, the right lower field, the left upper field, the left middle field and the left lower field. She has no wheezes. She has no rales.   Pulse ox on RA 96%   Musculoskeletal: She exhibits edema and tenderness.        Left lower leg: She exhibits tenderness, swelling and edema. She exhibits no bony tenderness, no deformity and no laceration.        Legs:  She has from knees down , erythema, edema, varicose veins, tenderness BLE   Neurological: She is alert and oriented to person, place, and time.   Skin: Rash noted. No purpura noted. Rash is macular, papular, maculopapular, vesicular and urticarial.  Rash is not nodular and not pustular. She is not diaphoretic. There is erythema.        Psychiatric: She has a normal mood and affect. Her behavior is normal.   Nursing note and vitals reviewed.      Assessment/Plan   Annika was seen today for leg pain and leg swelling.    Diagnoses and all orders for this visit:    Leg pain, bilateral  -     CBC & Differential; Future  -     Comprehensive metabolic panel; Future  -     Cancel: Duplex Lower Extremity Art / Grafts - Bilateral CAR  -     US venous doppler lower extremity bilateral (duplex)    Varicose veins of leg with swelling, bilateral  -     CBC & Differential; Future  -     Comprehensive metabolic panel; Future  -     Cancel: Duplex Lower Extremity Art / Grafts - Bilateral CAR  -     US venous doppler lower extremity bilateral (duplex)    Localized swelling of lower leg  -     CBC & Differential; Future  -     Comprehensive metabolic panel; Future  -     Cancel: Duplex Lower Extremity Art / Grafts - Bilateral CAR  -     US venous doppler lower extremity bilateral (duplex)    Other specified symptoms and signs involving the circulatory and respiratory systems   -     Cancel: Duplex Lower Extremity Art / Grafts - Bilateral CAR  -     US venous doppler lower extremity bilateral (duplex)    Essential hypertension    Tobacco user    Dyslipidemia    Dermatitis    Other orders  -     MethylPREDNISolone (MEDROL, JEFFERY,) 4 MG tablet; Take as directed on package instructions.  -     cephalexin (KEFLEX) 500 MG capsule; Take 1 capsule by mouth 2 (Two) Times a Day.  -     furosemide (LASIX) 20 MG tablet; Take 1 tablet by mouth Daily As Needed (fluid retention).    she is given keflex , lasix and medrol as above, follow up in 2 weeks, sooner with problems. Will obtain labs and US venous as above. Advised to stop smoking but pt declines. They are aware and are in agreement to this plan. All questions and concerns are addressed with understanding noted. Ready to quit:  No  Counseling given: Yes

## 2019-01-16 ENCOUNTER — OFFICE VISIT (OUTPATIENT)
Dept: FAMILY MEDICINE CLINIC | Facility: CLINIC | Age: 84
End: 2019-01-16

## 2019-01-16 VITALS
DIASTOLIC BLOOD PRESSURE: 68 MMHG | TEMPERATURE: 97.6 F | HEIGHT: 63 IN | BODY MASS INDEX: 21.23 KG/M2 | SYSTOLIC BLOOD PRESSURE: 122 MMHG | WEIGHT: 119.8 LBS | HEART RATE: 68 BPM

## 2019-01-16 DIAGNOSIS — L30.9 DERMATITIS: ICD-10-CM

## 2019-01-16 DIAGNOSIS — I82.432 ACUTE DEEP VEIN THROMBOSIS (DVT) OF POPLITEAL VEIN OF LEFT LOWER EXTREMITY (HCC): ICD-10-CM

## 2019-01-16 DIAGNOSIS — I10 ESSENTIAL HYPERTENSION: Chronic | ICD-10-CM

## 2019-01-16 DIAGNOSIS — E78.5 DYSLIPIDEMIA: Chronic | ICD-10-CM

## 2019-01-16 DIAGNOSIS — Z71.6 TOBACCO ABUSE COUNSELING: Chronic | ICD-10-CM

## 2019-01-16 DIAGNOSIS — Z00.00 INITIAL MEDICARE ANNUAL WELLNESS VISIT: Primary | ICD-10-CM

## 2019-01-16 PROCEDURE — G0438 PPPS, INITIAL VISIT: HCPCS | Performed by: NURSE PRACTITIONER

## 2019-01-16 PROCEDURE — 99214 OFFICE O/P EST MOD 30 MIN: CPT | Performed by: NURSE PRACTITIONER

## 2019-01-16 PROCEDURE — 96372 THER/PROPH/DIAG INJ SC/IM: CPT | Performed by: NURSE PRACTITIONER

## 2019-01-16 RX ORDER — APIXABAN 2.5 MG/1
2.5 TABLET, FILM COATED ORAL 2 TIMES DAILY
Refills: 0 | COMMUNITY
Start: 2019-01-11 | End: 2019-01-23 | Stop reason: SDUPTHER

## 2019-01-16 RX ORDER — TRIAMCINOLONE ACETONIDE 40 MG/ML
80 INJECTION, SUSPENSION INTRA-ARTICULAR; INTRAMUSCULAR ONCE
Status: COMPLETED | OUTPATIENT
Start: 2019-01-16 | End: 2019-01-16

## 2019-01-16 RX ADMIN — TRIAMCINOLONE ACETONIDE 80 MG: 40 INJECTION, SUSPENSION INTRA-ARTICULAR; INTRAMUSCULAR at 10:01

## 2019-01-16 NOTE — PROGRESS NOTES
QUICK REFERENCE INFORMATION:  The ABCs of the Annual Wellness Visit    Initial Medicare Wellness Visit    HEALTH RISK ASSESSMENT    1935    Recent Hospitalizations:  No hospitalization(s) within the last year..        Current Medical Providers:  Patient Care Team:  Stephanie Abdullahi APRN as PCP - General (Family Medicine)        Smoking Status:  Social History     Tobacco Use   Smoking Status Current Every Day Smoker   • Packs/day: 1.00   • Types: Cigarettes   Smokeless Tobacco Never Used       Alcohol Consumption:  Social History     Substance and Sexual Activity   Alcohol Use No       Depression Screen:   PHQ-2/PHQ-9 Depression Screening 8/2/2018   Little interest or pleasure in doing things 0   Feeling down, depressed, or hopeless 0   Total Score 0       Health Habits and Functional and Cognitive Screening:  No flowsheet data found.        Does the patient have evidence of cognitive impairment? No    Asiprin use counseling: Contraindicated from taking ASA      Recent Lab Results:    Visual Acuity:  No exam data present    Age-appropriate Screening Schedule:  Refer to the list below for future screening recommendations based on patient's age, sex and/or medical conditions. Orders for these recommended tests are listed in the plan section. The patient has been provided with a written plan.    Health Maintenance   Topic Date Due   • TDAP/TD VACCINES (1 - Tdap) 10/29/1955   • ZOSTER VACCINE (1 of 2) 10/29/1986   • PNEUMOCOCCAL VACCINES (65+ LOW/MEDIUM RISK) (1 of 2 - PCV13) 10/29/2001   • INFLUENZA VACCINE  Completed        Subjective   History of Present Illness    Annika Kimball is a 83 y.o. female who presents for an Annual Wellness Visit.    The following portions of the patient's history were reviewed and updated as appropriate:   She  has a past medical history of Acute bronchitis, Acute sinusitis, Dizziness, Dyslipidemia, Dysuria, Essential hypertension, Gastroesophageal reflux disease, Generalized  abdominal pain, and Tobacco user.  She does not have any pertinent problems on file.  She  has a past surgical history that includes Breast surgery; Hand surgery; ESOPHAGOGASTRODUODENOSCOPY (N/A, 9/26/2018); and COLONOSCOPY (N/A, 9/26/2018).  Her family history is not on file.  She  reports that she has been smoking cigarettes.  She has been smoking about 1.00 pack per day. she has never used smokeless tobacco. She reports that she does not drink alcohol or use drugs.  Current Outpatient Medications   Medication Sig Dispense Refill   • atenolol (TENORMIN) 50 MG tablet TAKE 1 TABLET BY MOUTH DAILY 30 tablet 5   • busPIRone (BUSPAR) 10 MG tablet Take 1 tablet by mouth 2 (Two) Times a Day As Needed (anxiety). 60 tablet 0   • COMBIGAN 0.2-0.5 % ophthalmic solution INSTILL ONE DROP IN LEFT EYE TWICE DAILY  12   • esomeprazole (NEXIUM) 40 MG capsule Take 1 capsule by mouth Every Morning Before Breakfast. 30 capsule 2   • furosemide (LASIX) 20 MG tablet Take 1 tablet by mouth Daily As Needed (fluid retention). 30 tablet 0   • traMADol (ULTRAM) 50 MG tablet Take 1 tablet by mouth Every 6 (Six) Hours As Needed for Moderate Pain . 40 tablet 0   • ELIQUIS 2.5 MG tablet tablet Take 2.5 mg by mouth 2 (Two) Times a Day.  0   • triamcinolone (KENALOG) 0.1 % ointment Apply  topically to the appropriate area as directed 2 (Two) Times a Day for 7 days. 80 g 0     Current Facility-Administered Medications   Medication Dose Route Frequency Provider Last Rate Last Dose   • cyanocobalamin injection 1,000 mcg  1,000 mcg Intramuscular Q28 Days Stephanie Abdullahi APRN   1,000 mcg at 10/29/18 1633     Current Outpatient Medications on File Prior to Visit   Medication Sig   • atenolol (TENORMIN) 50 MG tablet TAKE 1 TABLET BY MOUTH DAILY   • busPIRone (BUSPAR) 10 MG tablet Take 1 tablet by mouth 2 (Two) Times a Day As Needed (anxiety).   • COMBIGAN 0.2-0.5 % ophthalmic solution INSTILL ONE DROP IN LEFT EYE TWICE DAILY   • esomeprazole (NEXIUM) 40  MG capsule Take 1 capsule by mouth Every Morning Before Breakfast.   • furosemide (LASIX) 20 MG tablet Take 1 tablet by mouth Daily As Needed (fluid retention).   • traMADol (ULTRAM) 50 MG tablet Take 1 tablet by mouth Every 6 (Six) Hours As Needed for Moderate Pain .   • ELIQUIS 2.5 MG tablet tablet Take 2.5 mg by mouth 2 (Two) Times a Day.     Current Facility-Administered Medications on File Prior to Visit   Medication   • cyanocobalamin injection 1,000 mcg     She is allergic to sulfa antibiotics..    Outpatient Medications Prior to Visit   Medication Sig Dispense Refill   • atenolol (TENORMIN) 50 MG tablet TAKE 1 TABLET BY MOUTH DAILY 30 tablet 5   • busPIRone (BUSPAR) 10 MG tablet Take 1 tablet by mouth 2 (Two) Times a Day As Needed (anxiety). 60 tablet 0   • COMBIGAN 0.2-0.5 % ophthalmic solution INSTILL ONE DROP IN LEFT EYE TWICE DAILY  12   • esomeprazole (NEXIUM) 40 MG capsule Take 1 capsule by mouth Every Morning Before Breakfast. 30 capsule 2   • furosemide (LASIX) 20 MG tablet Take 1 tablet by mouth Daily As Needed (fluid retention). 30 tablet 0   • traMADol (ULTRAM) 50 MG tablet Take 1 tablet by mouth Every 6 (Six) Hours As Needed for Moderate Pain . 40 tablet 0   • MethylPREDNISolone (MEDROL, JEFFERY,) 4 MG tablet Take as directed on package instructions. 1 each 0   • ELIQUIS 2.5 MG tablet tablet Take 2.5 mg by mouth 2 (Two) Times a Day.  0   • cephalexin (KEFLEX) 500 MG capsule Take 1 capsule by mouth 2 (Two) Times a Day. 20 capsule 0     Facility-Administered Medications Prior to Visit   Medication Dose Route Frequency Provider Last Rate Last Dose   • cyanocobalamin injection 1,000 mcg  1,000 mcg Intramuscular Q28 Days Stephanie Abdullahi APRN   1,000 mcg at 10/29/18 1633       Patient Active Problem List   Diagnosis   • Tobacco user   • Tobacco abuse counseling   • Ischemic chest pain   • Essential hypertension   • Hypertensive heart disease with heart failure    • Dyslipidemia   • Lower abdominal pain   •  "Pain of upper abdomen   • Diarrhea       Advance Care Planning:  has NO advance directive - not interested in additional information    Identification of Risk Factors:  Risk factors include: weight , unhealthy diet, cardiovascular risk, tobacco use, increased fall risk, chronic pain, depression, cognitive impairment, vision limitations, hearing limitations and polypharmacy.    Review of Systems    Compared to one year ago, the patient feels her physical health is the same.  Compared to one year ago, the patient feels her mental health is the same.    Objective     Physical Exam    Vitals:    01/16/19 0859   BP: 122/68   Pulse: 68   Temp: 97.6 °F (36.4 °C)   Weight: 54.3 kg (119 lb 12.8 oz)   Height: 160 cm (63\")       Patient's Body mass index is 21.22 kg/m². BMI is below normal parameters. Recommendations include: treating the underlying disease process.      Assessment/Plan   Patient Self-Management and Personalized Health Advice  The patient has been provided with information about: diet, exercise, weight management, tobacco cessation, fall prevention, designing advance directives and mental health concerns and preventive services including:   · Advance directive, Exercise counseling provided, Fall Risk assessment done, Fall Risk plan of care done, Influenza vaccine, Nutrition counseling provided, Zostavax vaccine (Herpes Zoster).    Visit Diagnoses:    ICD-10-CM ICD-9-CM   1. Initial Medicare annual wellness visit Z00.00 V70.0   2. Dermatitis L30.9 692.9   3. Essential hypertension I10 401.9   4. Tobacco abuse counseling Z71.6 V65.42     305.1   5. Dyslipidemia E78.5 272.4   6. Acute deep vein thrombosis (DVT) of popliteal vein of left lower extremity (CMS/[facility]) I82.432 453.41       No orders of the defined types were placed in this encounter.      Outpatient Encounter Medications as of 1/16/2019   Medication Sig Dispense Refill   • atenolol (TENORMIN) 50 MG tablet TAKE 1 TABLET BY MOUTH DAILY 30 tablet 5   • " busPIRone (BUSPAR) 10 MG tablet Take 1 tablet by mouth 2 (Two) Times a Day As Needed (anxiety). 60 tablet 0   • COMBIGAN 0.2-0.5 % ophthalmic solution INSTILL ONE DROP IN LEFT EYE TWICE DAILY  12   • esomeprazole (NEXIUM) 40 MG capsule Take 1 capsule by mouth Every Morning Before Breakfast. 30 capsule 2   • furosemide (LASIX) 20 MG tablet Take 1 tablet by mouth Daily As Needed (fluid retention). 30 tablet 0   • traMADol (ULTRAM) 50 MG tablet Take 1 tablet by mouth Every 6 (Six) Hours As Needed for Moderate Pain . 40 tablet 0   • [DISCONTINUED] MethylPREDNISolone (MEDROL, JEFFERY,) 4 MG tablet Take as directed on package instructions. 1 each 0   • ELIQUIS 2.5 MG tablet tablet Take 2.5 mg by mouth 2 (Two) Times a Day.  0   • triamcinolone (KENALOG) 0.1 % ointment Apply  topically to the appropriate area as directed 2 (Two) Times a Day for 7 days. 80 g 0   • [DISCONTINUED] cephalexin (KEFLEX) 500 MG capsule Take 1 capsule by mouth 2 (Two) Times a Day. 20 capsule 0     Facility-Administered Encounter Medications as of 1/16/2019   Medication Dose Route Frequency Provider Last Rate Last Dose   • cyanocobalamin injection 1,000 mcg  1,000 mcg Intramuscular Q28 Days Stephanie Abdullahi, APRN   1,000 mcg at 10/29/18 1633   • [COMPLETED] triamcinolone acetonide (KENALOG-40) injection 80 mg  80 mg Intramuscular Once Stephanie Abdullahi APRN   80 mg at 01/16/19 1001       Reviewed use of high risk medication in the elderly: yes  Reviewed for potential of harmful drug interactions in the elderly: yes    Follow Up:  Return if symptoms worsen or fail to improve, for or sooner as needed, Next scheduled follow up.     An After Visit Summary and PPPS with all of these plans were given to the patient.

## 2019-01-21 NOTE — PROGRESS NOTES
Subjective   Annika Kimball is a 83 y.o. female. Patient here today with complaints of Follow-up  pt here today with daughter for recheck of DVT LLE, on eliquis bid. Tolerating well. Records from [facility] ER are obtained and reviewed. Denies chest pain, shortness of breath, hx of htn, hyperlipidemia, gerd, continues to smoke.     Vitals:    01/16/19 0859   BP: 122/68   Pulse: 68   Temp: 97.6 °F (36.4 °C)     Past Medical History:   Diagnosis Date   • Acute bronchitis    • Acute sinusitis    • Dizziness    • Dyslipidemia    • Dysuria    • Essential hypertension    • Gastroesophageal reflux disease    • Generalized abdominal pain    • Tobacco user      Hypertension   This is a chronic problem. The current episode started more than 1 year ago. The problem is unchanged. The problem is controlled. Associated symptoms include peripheral edema. Pertinent negatives include no chest pain or shortness of breath. There are no associated agents to hypertension. Risk factors for coronary artery disease include post-menopausal state, dyslipidemia and smoking/tobacco exposure. Past treatments include lifestyle changes, diuretics and beta blockers. Current antihypertension treatment includes lifestyle changes, diuretics and beta blockers. The current treatment provides moderate improvement. Compliance problems include exercise and diet.  There is no history of chronic renal disease.   Hyperlipidemia   This is a chronic problem. The current episode started more than 1 year ago. She has no history of chronic renal disease, diabetes, hypothyroidism, liver disease, obesity or nephrotic syndrome. Factors aggravating her hyperlipidemia include smoking, fatty foods and beta blockers. Pertinent negatives include no chest pain, focal sensory loss, focal weakness, leg pain, myalgias or shortness of breath. Current antihyperlipidemic treatment includes diet change and exercise. Compliance problems include adherence to diet.  Risk factors for  coronary artery disease include dyslipidemia, hypertension and post-menopausal.        The following portions of the patient's history were reviewed and updated as appropriate: allergies, current medications, past family history, past medical history, past social history, past surgical history and problem list.    Review of Systems   Constitutional: Negative.    HENT: Negative.    Eyes: Negative.    Respiratory: Negative.  Negative for shortness of breath.    Cardiovascular: Positive for leg swelling. Negative for chest pain.   Gastrointestinal: Negative.    Endocrine: Negative.    Genitourinary: Negative.    Musculoskeletal: Positive for arthralgias and gait problem. Negative for myalgias.   Skin: Negative.    Allergic/Immunologic: Negative.    Neurological: Negative for focal weakness.   Hematological: Negative.    Psychiatric/Behavioral: Negative.        Objective   Physical Exam   Constitutional: She is oriented to person, place, and time. She appears well-developed and well-nourished. No distress.   HENT:   Head: Normocephalic and atraumatic.   Neck: Neck supple.   Cardiovascular: Normal rate, regular rhythm and normal heart sounds. Exam reveals no gallop and no friction rub.   No murmur heard.  Pulmonary/Chest: Effort normal. No stridor. No respiratory distress. She has decreased breath sounds in the right upper field, the right middle field, the right lower field, the left upper field, the left middle field and the left lower field. She has no wheezes. She has no rales.   Hx chronic tobacco use    Musculoskeletal: She exhibits edema.   Neurological: She is alert and oriented to person, place, and time.   Skin: Skin is warm and dry. Rash noted. No purpura noted. Rash is macular, papular, maculopapular and urticarial. Rash is not nodular, not pustular and not vesicular. She is not diaphoretic. There is erythema. No pallor.        Psychiatric: She has a normal mood and affect. Her behavior is normal.   Nursing  note and vitals reviewed.      Assessment/Plan   Annika was seen today for follow-up.    Diagnoses and all orders for this visit:    Initial Medicare annual wellness visit    Dermatitis  -     triamcinolone acetonide (KENALOG-40) injection 80 mg; Inject 2 mL into the appropriate muscle as directed by prescriber 1 (One) Time.    Essential hypertension    Tobacco abuse counseling    Dyslipidemia    Acute deep vein thrombosis (DVT) of popliteal vein of left lower extremity (CMS/HCC)    Other orders  -     triamcinolone (KENALOG) 0.1 % ointment; Apply  topically to the appropriate area as directed 2 (Two) Times a Day for 7 days.    she is given kenalog oint as above, apply to affected areas bid x 7days, if no improvement, return for recheck  Records from [facility] ER are reviewed  Follow up here in 1 month, sooner if nec  She is aware and in agreement to this plan  Initial medicare wellness completed, health maintenance reviewed, immunizations discussed  Ready to quit: No  Counseling given: Yes    All questions and concerns are addressed with understanding noted.  Advised will likely need to stay on eliquis x 6 months, pt aware

## 2019-01-23 RX ORDER — APIXABAN 2.5 MG/1
2.5 TABLET, FILM COATED ORAL 2 TIMES DAILY
Qty: 60 TABLET | Refills: 2 | Status: SHIPPED | OUTPATIENT
Start: 2019-01-23 | End: 2019-04-24 | Stop reason: SDUPTHER

## 2019-02-25 RX ORDER — BUSPIRONE HYDROCHLORIDE 10 MG/1
10 TABLET ORAL 2 TIMES DAILY PRN
Qty: 60 TABLET | Refills: 5 | Status: SHIPPED | OUTPATIENT
Start: 2019-02-25 | End: 2019-07-10

## 2019-03-29 ENCOUNTER — OFFICE VISIT (OUTPATIENT)
Dept: GASTROENTEROLOGY | Facility: CLINIC | Age: 84
End: 2019-03-29

## 2019-03-29 VITALS
SYSTOLIC BLOOD PRESSURE: 136 MMHG | BODY MASS INDEX: 21.93 KG/M2 | WEIGHT: 123.8 LBS | HEART RATE: 82 BPM | HEIGHT: 63 IN | DIASTOLIC BLOOD PRESSURE: 82 MMHG

## 2019-03-29 DIAGNOSIS — R10.10 UPPER ABDOMINAL PAIN: Primary | ICD-10-CM

## 2019-03-29 DIAGNOSIS — K58.1 IRRITABLE BOWEL SYNDROME WITH CONSTIPATION: ICD-10-CM

## 2019-03-29 PROCEDURE — 99213 OFFICE O/P EST LOW 20 MIN: CPT | Performed by: NURSE PRACTITIONER

## 2019-03-29 NOTE — PROGRESS NOTES
Chief Complaint   Patient presents with   • Constipation   • Diarrhea   • Irritable Bowel Syndrome       Subjective    Annika Vee Kimball is a 83 y.o. female. she is here today for follow-up.    History of Present Illness  83-year-old female presents for follow-up regarding her bowel syndrome and epigastric pain.  States overall she has done very well her appetite has increased and she is up 2 pounds from last office visit.  Denies any nausea or vomiting.  States constipation is under control but still reports issues with small amounts of stool at a time denies any melena or hematochezia.  CT of abdomen pelvis was completed last August and hepatobiliary system noted no acute abnormalities.  Plan; continue current regimen have discussed ultrasound of abdomen due to upper abdominal pain on exam patient declines at this point if pain persists or worsen she will contact office will schedule ultrasound and [place] per her request.  Follow-up in 3 months for recheck return office sooner if needed       The following portions of the patient's history were reviewed and updated as appropriate:   Past Medical History:   Diagnosis Date   • Acute bronchitis    • Acute sinusitis    • Dizziness    • Dyslipidemia    • Dysuria    • Essential hypertension    • Gastroesophageal reflux disease    • Generalized abdominal pain    • Tobacco user      Past Surgical History:   Procedure Laterality Date   • BREAST SURGERY     • COLONOSCOPY N/A 9/26/2018    Procedure: COLONOSCOPY;  Surgeon: Migel Gaston MD;  Location: [facility] ENDOSCOPY;  Service: Gastroenterology   • ENDOSCOPY N/A 9/26/2018    Procedure: ESOPHAGOGASTRODUODENOSCOPY;  Surgeon: Migel Gaston MD;  Location: [facility] ENDOSCOPY;  Service: Gastroenterology   • HAND SURGERY       No family history on file.  OB History     No data available        Prior to Admission medications    Medication Sig Start Date End Date Taking? Authorizing Provider   atenolol (TENORMIN) 50 MG tablet  TAKE 1 TABLET BY MOUTH DAILY 2/27/18  Yes Stephanie Abdullahi APRN   busPIRone (BUSPAR) 10 MG tablet Take 1 tablet by mouth 2 (Two) Times a Day As Needed (anxiety). 2/25/19  Yes Stephanie Abdullahi APRN   COMBIGAN 0.2-0.5 % ophthalmic solution INSTILL ONE DROP IN LEFT EYE TWICE DAILY 9/13/18  Yes Provider, MD MEGAN Buenrostro 2.5 MG tablet tablet Take 1 tablet by mouth 2 (Two) Times a Day. 1/23/19  Yes Stephanie Abdullahi APRN   esomeprazole (NEXIUM) 40 MG capsule Take 1 capsule by mouth Every Morning Before Breakfast. 8/2/18  Yes Stephanie Abdullahi APRN   furosemide (LASIX) 20 MG tablet Take 1 tablet by mouth Daily As Needed (fluid retention). 1/9/19  Yes Stephanie Abdullahi APRN   traMADol (ULTRAM) 50 MG tablet Take 1 tablet by mouth Every 6 (Six) Hours As Needed for Moderate Pain . 11/27/18  Yes Stephanie Abdullahi APRN     Allergies   Allergen Reactions   • Sulfa Antibiotics Hives     Social History     Socioeconomic History   • Marital status: Single     Spouse name: Not on file   • Number of children: Not on file   • Years of education: Not on file   • Highest education level: Not on file   Tobacco Use   • Smoking status: Current Every Day Smoker     Packs/day: 1.00     Types: Cigarettes   • Smokeless tobacco: Never Used   Substance and Sexual Activity   • Alcohol use: No   • Drug use: No   • Sexual activity: Defer       Review of Systems  Review of Systems   Constitutional: Positive for fatigue. Negative for activity change, appetite change, chills, diaphoresis, fever and unexpected weight change.   HENT: Negative for sore throat and trouble swallowing.    Respiratory: Negative for shortness of breath.    Gastrointestinal: Positive for abdominal distention (increased gas and belching ), abdominal pain (upper abdominal pain ) and constipation (just a little at time ). Negative for anal bleeding, blood in stool, diarrhea, nausea, rectal pain and vomiting.   Musculoskeletal: Negative for arthralgias.   Skin: Negative for pallor.  "  Neurological: Negative for light-headedness.        /82 (BP Location: Left arm)   Pulse 82   Ht 160 cm (63\")   Wt 56.2 kg (123 lb 12.8 oz)   BMI 21.93 kg/m²     Objective    Physical Exam   Constitutional: She is oriented to person, place, and time. She appears well-developed and well-nourished. She is cooperative. No distress.   HENT:   Head: Normocephalic and atraumatic.   Neck: Normal range of motion. Neck supple. No thyromegaly present.   Cardiovascular: Normal rate, regular rhythm and normal heart sounds.   Pulmonary/Chest: Effort normal and breath sounds normal. She has no wheezes. She has no rhonchi. She has no rales.   Abdominal: Soft. Normal appearance and bowel sounds are normal. She exhibits no distension. There is no hepatosplenomegaly. There is tenderness in the right upper quadrant. There is no rigidity and no guarding. No hernia.   Lymphadenopathy:     She has no cervical adenopathy.   Neurological: She is alert and oriented to person, place, and time.   Skin: Skin is warm, dry and intact. No rash noted. No pallor.   Psychiatric: She has a normal mood and affect. Her speech is normal.     Lab on 01/09/2019   Component Date Value Ref Range Status   • Glucose 01/09/2019 116* 74 - 99 mg/dL Final   • BUN 01/09/2019 11  7 - 17 mg/dL Final   • Creatinine 01/09/2019 0.73  0.52 - 1.04 mg/dL Final   • Sodium 01/09/2019 136* 137 - 145 mmol/L Final   • Potassium 01/09/2019 4.4  3.4 - 5.0 mmol/L Final   • Chloride 01/09/2019 102  98 - 107 mmol/L Final   • CO2 01/09/2019 28.0  22.0 - 30.0 mmol/L Final   • Calcium 01/09/2019 9.5  8.4 - 10.2 mg/dL Final   • Total Protein 01/09/2019 6.9  6.3 - 8.2 g/dL Final   • Albumin 01/09/2019 4.20  3.50 - 5.00 g/dL Final   • ALT (SGPT) 01/09/2019 19  <=35 U/L Final   • AST (SGOT) 01/09/2019 35  14 - 36 U/L Final   • Alkaline Phosphatase 01/09/2019 101  38 - 126 U/L Final   • Total Bilirubin 01/09/2019 0.8  0.2 - 1.3 mg/dL Final   • eGFR Non African Amer 01/09/2019 76 "  39 - 90 mL/min/1.73 Final   • Globulin 01/09/2019 2.7  2.3 - 3.5 gm/dL Final   • A/G Ratio 01/09/2019 1.6  1.1 - 1.8 g/dL Final   • BUN/Creatinine Ratio 01/09/2019 15.1  7.0 - 25.0 Final   • Anion Gap 01/09/2019 6.0  5.0 - 15.0 mmol/L Final   • WBC 01/09/2019 4.94  3.20 - 9.80 10*3/mm3 Final   • RBC 01/09/2019 4.36  3.77 - 5.16 10*6/mm3 Final   • Hemoglobin 01/09/2019 13.1  12.0 - 15.5 g/dL Final   • Hematocrit 01/09/2019 40.0  35.0 - 45.0 % Final   • MCV 01/09/2019 91.7  80.0 - 98.0 fL Final   • MCH 01/09/2019 30.0  26.5 - 34.0 pg Final   • MCHC 01/09/2019 32.8  31.4 - 36.0 g/dL Final   • RDW 01/09/2019 13.3  11.5 - 14.5 % Final   • RDW-SD 01/09/2019 43.5  36.4 - 46.3 fl Final   • MPV 01/09/2019 9.3  8.0 - 12.0 fL Final   • Platelets 01/09/2019 280  150 - 450 10*3/mm3 Final   • Neutrophil % 01/09/2019 49.4  37.0 - 80.0 % Final   • Lymphocyte % 01/09/2019 34.6  10.0 - 50.0 % Final   • Monocyte % 01/09/2019 8.1  0.0 - 12.0 % Final   • Eosinophil % 01/09/2019 7.5* 0.0 - 7.0 % Final   • Basophil % 01/09/2019 0.4  0.0 - 2.0 % Final   • Neutrophils, Absolute 01/09/2019 2.44  2.00 - 8.60 10*3/mm3 Final   • Lymphocytes, Absolute 01/09/2019 1.71  0.60 - 4.20 10*3/mm3 Final   • Monocytes, Absolute 01/09/2019 0.40  0.00 - 0.90 10*3/mm3 Final   • Eosinophils, Absolute 01/09/2019 0.37  0.00 - 0.70 10*3/mm3 Final   • Basophils, Absolute 01/09/2019 0.02  0.00 - 0.20 10*3/mm3 Final     Assessment/Plan      1. Upper abdominal pain    2. Irritable bowel syndrome with constipation    .       Orders placed during this encounter include:  No orders of the defined types were placed in this encounter.      * Surgery not found *    Review and/or summary of lab tests, radiology, procedures, medications. Review and summary of old records and obtaining of history. The risks and benefits of my recommendations, as well as other treatment options were discussed with the patient today. Questions were answered.    No orders of the defined types  were placed in this encounter.      Follow-up: Return in about 3 months (around 6/29/2019).          This document has been electronically signed by ADRIENNE Hagan on March 29, 2019 9:28 AM             Results for orders placed or performed in visit on 01/09/19   CBC Auto Differential   Result Value Ref Range    WBC 4.94 3.20 - 9.80 10*3/mm3    RBC 4.36 3.77 - 5.16 10*6/mm3    Hemoglobin 13.1 12.0 - 15.5 g/dL    Hematocrit 40.0 35.0 - 45.0 %    MCV 91.7 80.0 - 98.0 fL    MCH 30.0 26.5 - 34.0 pg    MCHC 32.8 31.4 - 36.0 g/dL    RDW 13.3 11.5 - 14.5 %    RDW-SD 43.5 36.4 - 46.3 fl    MPV 9.3 8.0 - 12.0 fL    Platelets 280 150 - 450 10*3/mm3    Neutrophil % 49.4 37.0 - 80.0 %    Lymphocyte % 34.6 10.0 - 50.0 %    Monocyte % 8.1 0.0 - 12.0 %    Eosinophil % 7.5 (H) 0.0 - 7.0 %    Basophil % 0.4 0.0 - 2.0 %    Neutrophils, Absolute 2.44 2.00 - 8.60 10*3/mm3    Lymphocytes, Absolute 1.71 0.60 - 4.20 10*3/mm3    Monocytes, Absolute 0.40 0.00 - 0.90 10*3/mm3    Eosinophils, Absolute 0.37 0.00 - 0.70 10*3/mm3    Basophils, Absolute 0.02 0.00 - 0.20 10*3/mm3   Comprehensive metabolic panel   Result Value Ref Range    Glucose 116 (H) 74 - 99 mg/dL    BUN 11 7 - 17 mg/dL    Creatinine 0.73 0.52 - 1.04 mg/dL    Sodium 136 (L) 137 - 145 mmol/L    Potassium 4.4 3.4 - 5.0 mmol/L    Chloride 102 98 - 107 mmol/L    CO2 28.0 22.0 - 30.0 mmol/L    Calcium 9.5 8.4 - 10.2 mg/dL    Total Protein 6.9 6.3 - 8.2 g/dL    Albumin 4.20 3.50 - 5.00 g/dL    ALT (SGPT) 19 <=35 U/L    AST (SGOT) 35 14 - 36 U/L    Alkaline Phosphatase 101 38 - 126 U/L    Total Bilirubin 0.8 0.2 - 1.3 mg/dL    eGFR Non African Amer 76 39 - 90 mL/min/1.73    Globulin 2.7 2.3 - 3.5 gm/dL    A/G Ratio 1.6 1.1 - 1.8 g/dL    BUN/Creatinine Ratio 15.1 7.0 - 25.0    Anion Gap 6.0 5.0 - 15.0 mmol/L   Results for orders placed or performed during the hospital encounter of 09/26/18   Tissue Pathology Exam   Result Value Ref Range    Case Report       Surgical Pathology  Report                         Case: [id number]                                  Authorizing Provider:  Migel Gaston MD        Collected:           09/26/2018 05:06 PM          Ordering Location:     [facility]             Received:            09/27/2018 07:58 AM                                 [facility]                                                     Pathologist:           Jameson Hernandez MD                                                        Specimens:   1) - Gastric, Antrum                                                                                2) - Esophagus, Distal                                                                              3) - Large Intestine, Right / Ascending Colon                                                       4) - Large Intestine, Transverse Colon                                                              5) - Large Intestine, Sigmoid Colon                                                                  6) - Large Intestine, Rectum                                                               Final Diagnosis       1.  GASTRIC ANTRUM, MUCOSAL BIOPSY:   MILD CHRONIC GASTRITIS.   NO EVIDENCE OF HELICOBACTER PYLORI.     2.  DISTAL ESOPHAGUS, MUCOSAL BIOPSY:   SEGMENTS OF MILDLY INFLAMED STRATIFIED SQUAMOUS EPITHELIUM.      3.  ASCENDING COLON, MUCOSAL BIOPSY:   MILD COLITIS, NOS.   NO EVIDENCE OF CRYPT DISTORTION.    4.  TRANSVERSE COLON, MUCOSAL BIOPSY:   MILD COLITIS, NOS.   NO EVIDENCE OF CRYPT DISTORTION.     5.  SIGMOID COLON, MUCOSAL BIOPSY:   MILD COLITIS, NOS.   NO EVIDENCE OF CRYPT DISTORTION.     6.  RECTUM, MUCOSAL BIOPSY:   NO SIGNIFICANT PATHOLOGIC DIAGNOSIS.       Gross Description       In 6 containers, each of these show mucosal biopsies measuring up to 0.3 cm in greatest dimension.  Embedded accordingly:  1A antrum, 2A distal esophagus, 3A ascending colon, 4A transverse colon, 5A sigmoid colon, 6A rectum.        Results for  orders placed or performed in visit on 08/02/18   Helicobacter Pylori, IgA IgG IgM   Result Value Ref Range    H. pylori IgG 0.17 0.00 - 0.79 Index Value    H. pylori, IgA ABS <9.0 0.0 - 8.9 units    H. Pylori, IgM <9.0 0.0 - 8.9 units   Lipase   Result Value Ref Range    Lipase 160 23 - 300 U/L   Amylase   Result Value Ref Range    Amylase 66 30 - 110 U/L   Results for orders placed or performed in visit on 07/26/18   CBC Auto Differential   Result Value Ref Range    WBC 6.95 3.20 - 9.80 10*3/mm3    RBC 4.55 3.77 - 5.16 10*6/mm3    Hemoglobin 14.3 12.0 - 15.5 g/dL    Hematocrit 42.6 35.0 - 45.0 %    MCV 93.6 80.0 - 98.0 fL    MCH 31.4 26.5 - 34.0 pg    MCHC 33.6 31.4 - 36.0 g/dL    RDW 12.6 11.5 - 14.5 %    RDW-SD 42.3 36.4 - 46.3 fl    MPV 9.1 8.0 - 12.0 fL    Platelets 284 150 - 450 10*3/mm3    Neutrophil % 63.1 37.0 - 80.0 %    Lymphocyte % 21.3 10.0 - 50.0 %    Monocyte % 12.5 (H) 0.0 - 12.0 %    Eosinophil % 3.0 0.0 - 7.0 %    Basophil % 0.1 0.0 - 2.0 %    Neutrophils, Absolute 4.38 2.00 - 8.60 10*3/mm3    Lymphocytes, Absolute 1.48 0.60 - 4.20 10*3/mm3    Monocytes, Absolute 0.87 0.00 - 0.90 10*3/mm3    Eosinophils, Absolute 0.21 0.00 - 0.70 10*3/mm3    Basophils, Absolute 0.01 0.00 - 0.20 10*3/mm3   Comprehensive metabolic panel   Result Value Ref Range    Glucose 101 (H) 74 - 99 mg/dL    BUN 8 7 - 17 mg/dL    Creatinine 1.00 0.52 - 1.04 mg/dL    Sodium 138 137 - 145 mmol/L    Potassium 3.6 3.4 - 5.0 mmol/L    Chloride 101 98 - 107 mmol/L    CO2 21.0 (L) 22.0 - 30.0 mmol/L    Calcium 9.5 8.4 - 10.2 mg/dL    Total Protein 6.8 6.3 - 8.2 g/dL    Albumin 4.20 3.50 - 5.00 g/dL    ALT (SGPT) 14 <=35 U/L    AST (SGOT) 23 14 - 36 U/L    Alkaline Phosphatase 66 38 - 126 U/L    Total Bilirubin 0.7 0.2 - 1.3 mg/dL    eGFR Non African Amer 53 39 - 90 mL/min/1.73    Globulin 2.6 2.3 - 3.5 gm/dL    A/G Ratio 1.6 1.1 - 1.8 g/dL    BUN/Creatinine Ratio 8.0 7.0 - 25.0    Anion Gap 16.0 (H) 5.0 - 15.0 mmol/L   Results for  orders placed or performed in visit on 07/09/18   CBC Auto Differential   Result Value Ref Range    WBC 6.66 3.20 - 9.80 10*3/mm3    RBC 4.51 3.77 - 5.16 10*6/mm3    Hemoglobin 14.3 12.0 - 15.5 g/dL    Hematocrit 42.9 35.0 - 45.0 %    MCV 95.1 80.0 - 98.0 fL    MCH 31.7 26.5 - 34.0 pg    MCHC 33.3 31.4 - 36.0 g/dL    RDW 12.8 11.5 - 14.5 %    RDW-SD 43.3 36.4 - 46.3 fl    MPV 9.4 8.0 - 12.0 fL    Platelets 280 150 - 450 10*3/mm3    Neutrophil % 52.5 37.0 - 80.0 %    Lymphocyte % 36.9 10.0 - 50.0 %    Monocyte % 8.6 0.0 - 12.0 %    Eosinophil % 1.8 0.0 - 7.0 %    Basophil % 0.2 0.0 - 2.0 %    Neutrophils, Absolute 3.50 2.00 - 8.60 10*3/mm3    Lymphocytes, Absolute 2.46 0.60 - 4.20 10*3/mm3    Monocytes, Absolute 0.57 0.00 - 0.90 10*3/mm3    Eosinophils, Absolute 0.12 0.00 - 0.70 10*3/mm3    Basophils, Absolute 0.01 0.00 - 0.20 10*3/mm3     *Note: Due to a large number of results and/or encounters for the requested time period, some results have not been displayed. A complete set of results can be found in Results Review.

## 2019-03-29 NOTE — PATIENT INSTRUCTIONS
Irritable Bowel Syndrome, Adult  Irritable bowel syndrome (IBS) is not one specific disease. It is a group of symptoms that affects the organs responsible for digestion (gastrointestinal or GI tract).  To regulate how your GI tract works, your body sends signals back and forth between your intestines and your brain. If you have IBS, there may be a problem with these signals. As a result, your GI tract does not function normally. Your intestines may become more sensitive and overreact to certain things. This is especially true when you eat certain foods or when you are under stress.  There are four types of IBS. These may be determined based on the consistency of your stool:  · IBS with diarrhea.  · IBS with constipation.  · Mixed IBS.  · Unsubtyped IBS.    It is important to know which type of IBS you have. Some treatments are more likely to be helpful for certain types of IBS.  What are the causes?  The exact cause of IBS is not known.  What increases the risk?  You may have a higher risk of IBS if:  · You are a woman.  · You are younger than 45 years old.  · You have a family history of IBS.  · You have mental health problems.  · You have had bacterial infection of your GI tract.    What are the signs or symptoms?  Symptoms of IBS vary from person to person. The main symptom is abdominal pain or discomfort. Additional symptoms usually include one or more of the following:  · Diarrhea, constipation, or both.  · Abdominal swelling or bloating.  · Feeling full or sick after eating a small or regular-size meal.  · Frequent gas.  · Mucus in the stool.  · A feeling of having more stool left after a bowel movement.    Symptoms tend to come and go. They may be associated with stress, psychiatric conditions, or nothing at all.  How is this diagnosed?  There is no specific test to diagnose IBS. Your health care provider will make a diagnosis based on a physical exam, medical history, and your symptoms. You may have other  tests to rule out other conditions that may be causing your symptoms. These may include:  · Blood tests.  · X-rays.  · CT scan.  · Endoscopy and colonoscopy. This is a test in which your GI tract is viewed with a long, thin, flexible tube.    How is this treated?  There is no cure for IBS, but treatment can help relieve symptoms. IBS treatment often includes:  · Changes to your diet, such as:  ? Eating more fiber.  ? Avoiding foods that cause symptoms.  ? Drinking more water.  ? Eating regular, medium-sized portioned meals.  · Medicines. These may include:  ? Fiber supplements if you have constipation.  ? Medicine to control diarrhea (antidiarrheal medicines).  ? Medicine to help control muscle spasms in your GI tract (antispasmodic medicines).  ? Medicines to help with any mental health issues, such as antidepressants or tranquilizers.  · Therapy.  ? Talk therapy may help with anxiety, depression, or other mental health issues that can make IBS symptoms worse.  · Stress reduction.  ? Managing your stress can help keep symptoms under control.    Follow these instructions at home:  · Take medicines only as directed by your health care provider.  · Eat a healthy diet.  ? Avoid foods and drinks with added sugar.  ? Include more whole grains, fruits, and vegetables gradually into your diet. This may be especially helpful if you have IBS with constipation.  ? Avoid any foods and drinks that make your symptoms worse. These may include dairy products and caffeinated or carbonated drinks.  ? Do not eat large meals.  ? Drink enough fluid to keep your urine clear or pale yellow.  · Exercise regularly. Ask your health care provider for recommendations of good activities for you.  · Keep all follow-up visits as directed by your health care provider. This is important.  Contact a health care provider if:  · You have constant pain.  · You have trouble or pain with swallowing.  · You have worsening diarrhea.  Get help right away  if:  · You have severe and worsening abdominal pain.  · You have diarrhea and:  ? You have a rash, stiff neck, or severe headache.  ? You are irritable, sleepy, or difficult to awaken.  ? You are weak, dizzy, or extremely thirsty.  · You have bright red blood in your stool or you have black tarry stools.  · You have unusual abdominal swelling that is painful.  · You vomit continuously.  · You vomit blood (hematemesis).  · You have both abdominal pain and a fever.  This information is not intended to replace advice given to you by your health care provider. Make sure you discuss any questions you have with your health care provider.  Document Released: 12/18/2006 Document Revised: 05/19/2017 Document Reviewed: 09/04/2015  [employer] Interactive Patient Education © 2019 [employer]

## 2019-04-24 RX ORDER — APIXABAN 2.5 MG/1
2.5 TABLET, FILM COATED ORAL 2 TIMES DAILY
Qty: 60 TABLET | Refills: 2 | Status: SHIPPED | OUTPATIENT
Start: 2019-04-24 | End: 2019-07-22 | Stop reason: SDUPTHER

## 2019-07-01 RX ORDER — CIPROFLOXACIN 500 MG/1
500 TABLET, FILM COATED ORAL 2 TIMES DAILY
Qty: 20 TABLET | Refills: 0 | Status: SHIPPED | OUTPATIENT
Start: 2019-07-01 | End: 2019-07-10

## 2019-07-01 RX ORDER — METRONIDAZOLE 500 MG/1
500 TABLET ORAL 2 TIMES DAILY
Qty: 20 TABLET | Refills: 0 | Status: SHIPPED | OUTPATIENT
Start: 2019-07-01 | End: 2019-07-10

## 2019-07-10 ENCOUNTER — OFFICE VISIT (OUTPATIENT)
Dept: GASTROENTEROLOGY | Facility: CLINIC | Age: 84
End: 2019-07-10

## 2019-07-10 VITALS
SYSTOLIC BLOOD PRESSURE: 112 MMHG | BODY MASS INDEX: 22.43 KG/M2 | HEART RATE: 80 BPM | WEIGHT: 126.6 LBS | HEIGHT: 63 IN | DIASTOLIC BLOOD PRESSURE: 68 MMHG

## 2019-07-10 DIAGNOSIS — K58.9 IRRITABLE BOWEL SYNDROME WITHOUT DIARRHEA: Primary | ICD-10-CM

## 2019-07-10 PROCEDURE — 99213 OFFICE O/P EST LOW 20 MIN: CPT | Performed by: NURSE PRACTITIONER

## 2019-07-10 NOTE — PROGRESS NOTES
Chief Complaint   Patient presents with   • Constipation   • Diarrhea   • Irritable Bowel Syndrome       Subjective    Annika Vee Kimball is a 83 y.o. female. she is here today for follow-up.  83-year-old female presents for follow-up regarding irritable bowel syndrome and abdominal pain.  States overall she has done very well. States she has issues with diarrhea and constipation at times.  Bowel habits vary greatly.  Previous colonoscopy 9/26/2018.  States her abdomen feels sore not severe pain at this point.    Irritable Bowel Syndrome   This is a chronic problem. The current episode started more than 1 year ago. The problem occurs intermittently. The problem has been waxing and waning. Associated symptoms include abdominal pain. Pertinent negatives include no arthralgias, chills, diaphoresis, fatigue, fever, nausea, sore throat or vomiting. Associated symptoms comments: About once a week gas like pain no associated diarrhea or change in bowel habit .     I discussed possibility of antispasmodic medication for abdominal cramping however patient prefers to use over-the-counter regimens states abdominal pain is not severe at this point does not want to schedule ultrasound as previously discussed.  Plan; follow-up in 6 months for recheck return office sooner if needed.     The following portions of the patient's history were reviewed and updated as appropriate:   Past Medical History:   Diagnosis Date   • Acute bronchitis    • Acute sinusitis    • Dizziness    • Dyslipidemia    • Dysuria    • Essential hypertension    • Gastroesophageal reflux disease    • Generalized abdominal pain    • Tobacco user      Past Surgical History:   Procedure Laterality Date   • BREAST SURGERY     • COLONOSCOPY N/A 9/26/2018    Procedure: COLONOSCOPY;  Surgeon: Migel Gaston MD;  Location: [facility] ENDOSCOPY;  Service: Gastroenterology   • ENDOSCOPY N/A 9/26/2018    Procedure: ESOPHAGOGASTRODUODENOSCOPY;  Surgeon: Migel Gaston MD;   Location: [facility] ENDOSCOPY;  Service: Gastroenterology   • HAND SURGERY       No family history on file.  OB History     No data available        Prior to Admission medications    Medication Sig Start Date End Date Taking? Authorizing Provider   COMBIGAN 0.2-0.5 % ophthalmic solution INSTILL ONE DROP IN LEFT EYE TWICE DAILY 9/13/18  Yes Provider, MD MEGAN Buenrostro 2.5 MG tablet tablet TAKE 1 TABLET BY MOUTH 2 (TWO) TIMES A DAY. 4/24/19  Yes Stephanie Abdullahi APRN   atenolol (TENORMIN) 50 MG tablet TAKE 1 TABLET BY MOUTH DAILY 2/27/18 7/10/19 Yes Stephanie Abdullahi APRN   busPIRone (BUSPAR) 10 MG tablet Take 1 tablet by mouth 2 (Two) Times a Day As Needed (anxiety). 2/25/19 7/10/19 Yes Stephanie Abdullahi APRN   ciprofloxacin (CIPRO) 500 MG tablet Take 1 tablet by mouth 2 (Two) Times a Day for 10 days. 7/1/19 7/10/19 Yes Stephanie Abdullahi APRN   esomeprazole (NEXIUM) 40 MG capsule Take 1 capsule by mouth Every Morning Before Breakfast. 8/2/18 7/10/19 Yes Stephanie Abdullahi APRN   furosemide (LASIX) 20 MG tablet Take 1 tablet by mouth Daily As Needed (fluid retention). 1/9/19 7/10/19 Yes Stephanie Abdullahi APRN   metroNIDAZOLE (FLAGYL) 500 MG tablet Take 1 tablet by mouth 2 (Two) Times a Day for 10 days. 7/1/19 7/10/19 Yes Stephanie Abdullahi APRN   traMADol (ULTRAM) 50 MG tablet Take 1 tablet by mouth Every 6 (Six) Hours As Needed for Moderate Pain . 11/27/18 7/10/19 Yes Stephanie Abdullahi APRN     Allergies   Allergen Reactions   • Sulfa Antibiotics Hives     Social History     Socioeconomic History   • Marital status: Single     Spouse name: Not on file   • Number of children: Not on file   • Years of education: Not on file   • Highest education level: Not on file   Tobacco Use   • Smoking status: Current Every Day Smoker     Packs/day: 1.00     Types: Cigarettes   • Smokeless tobacco: Never Used   Substance and Sexual Activity   • Alcohol use: No   • Drug use: No   • Sexual activity: Defer       Review of Systems  Review of  "Systems   Constitutional: Negative for activity change, appetite change, chills, diaphoresis, fatigue, fever and unexpected weight change.   HENT: Negative for sore throat and trouble swallowing.    Respiratory: Negative for shortness of breath.    Gastrointestinal: Positive for abdominal pain. Negative for abdominal distention, anal bleeding, blood in stool, constipation, diarrhea, nausea, rectal pain and vomiting.   Musculoskeletal: Negative for arthralgias.   Skin: Negative for pallor.   Neurological: Negative for light-headedness.        /68 (BP Location: Left arm)   Pulse 80   Ht 160 cm (63\")   Wt 57.4 kg (126 lb 9.6 oz)   BMI 22.43 kg/m²     Objective    Physical Exam   Constitutional: She is oriented to person, place, and time. She appears well-developed and well-nourished. She is cooperative. No distress.   HENT:   Head: Normocephalic and atraumatic.   Neck: Normal range of motion. Neck supple. No thyromegaly present.   Cardiovascular: Normal rate, regular rhythm and normal heart sounds.   Pulmonary/Chest: Effort normal and breath sounds normal. She has no wheezes. She has no rhonchi. She has no rales.   Abdominal: Soft. Normal appearance and bowel sounds are normal. She exhibits no distension. There is no hepatosplenomegaly. There is no tenderness. There is no rigidity and no guarding. No hernia.   Lymphadenopathy:     She has no cervical adenopathy.   Neurological: She is alert and oriented to person, place, and time.   Skin: Skin is warm, dry and intact. No rash noted. No pallor.   Psychiatric: She has a normal mood and affect. Her speech is normal.     Lab on 01/09/2019   Component Date Value Ref Range Status   • Glucose 01/09/2019 116* 74 - 99 mg/dL Final   • BUN 01/09/2019 11  7 - 17 mg/dL Final   • Creatinine 01/09/2019 0.73  0.52 - 1.04 mg/dL Final   • Sodium 01/09/2019 136* 137 - 145 mmol/L Final   • Potassium 01/09/2019 4.4  3.4 - 5.0 mmol/L Final   • Chloride 01/09/2019 102  98 - 107 " mmol/L Final   • CO2 01/09/2019 28.0  22.0 - 30.0 mmol/L Final   • Calcium 01/09/2019 9.5  8.4 - 10.2 mg/dL Final   • Total Protein 01/09/2019 6.9  6.3 - 8.2 g/dL Final   • Albumin 01/09/2019 4.20  3.50 - 5.00 g/dL Final   • ALT (SGPT) 01/09/2019 19  <=35 U/L Final   • AST (SGOT) 01/09/2019 35  14 - 36 U/L Final   • Alkaline Phosphatase 01/09/2019 101  38 - 126 U/L Final   • Total Bilirubin 01/09/2019 0.8  0.2 - 1.3 mg/dL Final   • eGFR Non African Amer 01/09/2019 76  39 - 90 mL/min/1.73 Final   • Globulin 01/09/2019 2.7  2.3 - 3.5 gm/dL Final   • A/G Ratio 01/09/2019 1.6  1.1 - 1.8 g/dL Final   • BUN/Creatinine Ratio 01/09/2019 15.1  7.0 - 25.0 Final   • Anion Gap 01/09/2019 6.0  5.0 - 15.0 mmol/L Final   • WBC 01/09/2019 4.94  3.20 - 9.80 10*3/mm3 Final   • RBC 01/09/2019 4.36  3.77 - 5.16 10*6/mm3 Final   • Hemoglobin 01/09/2019 13.1  12.0 - 15.5 g/dL Final   • Hematocrit 01/09/2019 40.0  35.0 - 45.0 % Final   • MCV 01/09/2019 91.7  80.0 - 98.0 fL Final   • MCH 01/09/2019 30.0  26.5 - 34.0 pg Final   • MCHC 01/09/2019 32.8  31.4 - 36.0 g/dL Final   • RDW 01/09/2019 13.3  11.5 - 14.5 % Final   • RDW-SD 01/09/2019 43.5  36.4 - 46.3 fl Final   • MPV 01/09/2019 9.3  8.0 - 12.0 fL Final   • Platelets 01/09/2019 280  150 - 450 10*3/mm3 Final   • Neutrophil % 01/09/2019 49.4  37.0 - 80.0 % Final   • Lymphocyte % 01/09/2019 34.6  10.0 - 50.0 % Final   • Monocyte % 01/09/2019 8.1  0.0 - 12.0 % Final   • Eosinophil % 01/09/2019 7.5* 0.0 - 7.0 % Final   • Basophil % 01/09/2019 0.4  0.0 - 2.0 % Final   • Neutrophils, Absolute 01/09/2019 2.44  2.00 - 8.60 10*3/mm3 Final   • Lymphocytes, Absolute 01/09/2019 1.71  0.60 - 4.20 10*3/mm3 Final   • Monocytes, Absolute 01/09/2019 0.40  0.00 - 0.90 10*3/mm3 Final   • Eosinophils, Absolute 01/09/2019 0.37  0.00 - 0.70 10*3/mm3 Final   • Basophils, Absolute 01/09/2019 0.02  0.00 - 0.20 10*3/mm3 Final     Assessment/Plan      1. Irritable bowel syndrome without diarrhea    .        Orders placed during this encounter include:  No orders of the defined types were placed in this encounter.      * Surgery not found *    Review and/or summary of lab tests, radiology, procedures, medications. Review and summary of old records and obtaining of history. The risks and benefits of my recommendations, as well as other treatment options were discussed with the patient today. Questions were answered.    No orders of the defined types were placed in this encounter.      Follow-up: Return in about 6 months (around 1/10/2020).          This document has been electronically signed by ADRIENNE Hagan on July 12, 2019 1:24 PM             Results for orders placed or performed in visit on 01/09/19   CBC Auto Differential   Result Value Ref Range    WBC 4.94 3.20 - 9.80 10*3/mm3    RBC 4.36 3.77 - 5.16 10*6/mm3    Hemoglobin 13.1 12.0 - 15.5 g/dL    Hematocrit 40.0 35.0 - 45.0 %    MCV 91.7 80.0 - 98.0 fL    MCH 30.0 26.5 - 34.0 pg    MCHC 32.8 31.4 - 36.0 g/dL    RDW 13.3 11.5 - 14.5 %    RDW-SD 43.5 36.4 - 46.3 fl    MPV 9.3 8.0 - 12.0 fL    Platelets 280 150 - 450 10*3/mm3    Neutrophil % 49.4 37.0 - 80.0 %    Lymphocyte % 34.6 10.0 - 50.0 %    Monocyte % 8.1 0.0 - 12.0 %    Eosinophil % 7.5 (H) 0.0 - 7.0 %    Basophil % 0.4 0.0 - 2.0 %    Neutrophils, Absolute 2.44 2.00 - 8.60 10*3/mm3    Lymphocytes, Absolute 1.71 0.60 - 4.20 10*3/mm3    Monocytes, Absolute 0.40 0.00 - 0.90 10*3/mm3    Eosinophils, Absolute 0.37 0.00 - 0.70 10*3/mm3    Basophils, Absolute 0.02 0.00 - 0.20 10*3/mm3   Comprehensive metabolic panel   Result Value Ref Range    Glucose 116 (H) 74 - 99 mg/dL    BUN 11 7 - 17 mg/dL    Creatinine 0.73 0.52 - 1.04 mg/dL    Sodium 136 (L) 137 - 145 mmol/L    Potassium 4.4 3.4 - 5.0 mmol/L    Chloride 102 98 - 107 mmol/L    CO2 28.0 22.0 - 30.0 mmol/L    Calcium 9.5 8.4 - 10.2 mg/dL    Total Protein 6.9 6.3 - 8.2 g/dL    Albumin 4.20 3.50 - 5.00 g/dL    ALT (SGPT) 19 <=35 U/L    AST (SGOT) 35  14 - 36 U/L    Alkaline Phosphatase 101 38 - 126 U/L    Total Bilirubin 0.8 0.2 - 1.3 mg/dL    eGFR Non African Amer 76 39 - 90 mL/min/1.73    Globulin 2.7 2.3 - 3.5 gm/dL    A/G Ratio 1.6 1.1 - 1.8 g/dL    BUN/Creatinine Ratio 15.1 7.0 - 25.0    Anion Gap 6.0 5.0 - 15.0 mmol/L   Results for orders placed or performed during the hospital encounter of 09/26/18   Tissue Pathology Exam   Result Value Ref Range    Case Report       Surgical Pathology Report                         Case: [id number]                                  Authorizing Provider:  Migel Gaston MD        Collected:           09/26/2018 05:06 PM          Ordering Location:     [facility]             Received:            09/27/2018 07:58 AM                                 [facility]                                                     Pathologist:           Jameson Hernandez MD                                                        Specimens:   1) - Gastric, Antrum                                                                                2) - Esophagus, Distal                                                                              3) - Large Intestine, Right / Ascending Colon                                                       4) - Large Intestine, Transverse Colon                                                              5) - Large Intestine, Sigmoid Colon                                                                  6) - Large Intestine, Rectum                                                               Final Diagnosis       1.  GASTRIC ANTRUM, MUCOSAL BIOPSY:   MILD CHRONIC GASTRITIS.   NO EVIDENCE OF HELICOBACTER PYLORI.     2.  DISTAL ESOPHAGUS, MUCOSAL BIOPSY:   SEGMENTS OF MILDLY INFLAMED STRATIFIED SQUAMOUS EPITHELIUM.      3.  ASCENDING COLON, MUCOSAL BIOPSY:   MILD COLITIS, NOS.   NO EVIDENCE OF CRYPT DISTORTION.    4.  TRANSVERSE COLON, MUCOSAL BIOPSY:   MILD COLITIS, NOS.   NO EVIDENCE OF CRYPT  DISTORTION.     5.  SIGMOID COLON, MUCOSAL BIOPSY:   MILD COLITIS, NOS.   NO EVIDENCE OF CRYPT DISTORTION.     6.  RECTUM, MUCOSAL BIOPSY:   NO SIGNIFICANT PATHOLOGIC DIAGNOSIS.       Gross Description       In 6 containers, each of these show mucosal biopsies measuring up to 0.3 cm in greatest dimension.  Embedded accordingly:  1A antrum, 2A distal esophagus, 3A ascending colon, 4A transverse colon, 5A sigmoid colon, 6A rectum.        Results for orders placed or performed in visit on 08/02/18   Helicobacter Pylori, IgA IgG IgM   Result Value Ref Range    H. pylori IgG 0.17 0.00 - 0.79 Index Value    H. pylori, IgA ABS <9.0 0.0 - 8.9 units    H. Pylori, IgM <9.0 0.0 - 8.9 units   Lipase   Result Value Ref Range    Lipase 160 23 - 300 U/L   Amylase   Result Value Ref Range    Amylase 66 30 - 110 U/L   Results for orders placed or performed in visit on 07/26/18   CBC Auto Differential   Result Value Ref Range    WBC 6.95 3.20 - 9.80 10*3/mm3    RBC 4.55 3.77 - 5.16 10*6/mm3    Hemoglobin 14.3 12.0 - 15.5 g/dL    Hematocrit 42.6 35.0 - 45.0 %    MCV 93.6 80.0 - 98.0 fL    MCH 31.4 26.5 - 34.0 pg    MCHC 33.6 31.4 - 36.0 g/dL    RDW 12.6 11.5 - 14.5 %    RDW-SD 42.3 36.4 - 46.3 fl    MPV 9.1 8.0 - 12.0 fL    Platelets 284 150 - 450 10*3/mm3    Neutrophil % 63.1 37.0 - 80.0 %    Lymphocyte % 21.3 10.0 - 50.0 %    Monocyte % 12.5 (H) 0.0 - 12.0 %    Eosinophil % 3.0 0.0 - 7.0 %    Basophil % 0.1 0.0 - 2.0 %    Neutrophils, Absolute 4.38 2.00 - 8.60 10*3/mm3    Lymphocytes, Absolute 1.48 0.60 - 4.20 10*3/mm3    Monocytes, Absolute 0.87 0.00 - 0.90 10*3/mm3    Eosinophils, Absolute 0.21 0.00 - 0.70 10*3/mm3    Basophils, Absolute 0.01 0.00 - 0.20 10*3/mm3   Comprehensive metabolic panel   Result Value Ref Range    Glucose 101 (H) 74 - 99 mg/dL    BUN 8 7 - 17 mg/dL    Creatinine 1.00 0.52 - 1.04 mg/dL    Sodium 138 137 - 145 mmol/L    Potassium 3.6 3.4 - 5.0 mmol/L    Chloride 101 98 - 107 mmol/L    CO2 21.0 (L) 22.0 -  30.0 mmol/L    Calcium 9.5 8.4 - 10.2 mg/dL    Total Protein 6.8 6.3 - 8.2 g/dL    Albumin 4.20 3.50 - 5.00 g/dL    ALT (SGPT) 14 <=35 U/L    AST (SGOT) 23 14 - 36 U/L    Alkaline Phosphatase 66 38 - 126 U/L    Total Bilirubin 0.7 0.2 - 1.3 mg/dL    eGFR Non African Amer 53 39 - 90 mL/min/1.73    Globulin 2.6 2.3 - 3.5 gm/dL    A/G Ratio 1.6 1.1 - 1.8 g/dL    BUN/Creatinine Ratio 8.0 7.0 - 25.0    Anion Gap 16.0 (H) 5.0 - 15.0 mmol/L   Results for orders placed or performed in visit on 07/09/18   CBC Auto Differential   Result Value Ref Range    WBC 6.66 3.20 - 9.80 10*3/mm3    RBC 4.51 3.77 - 5.16 10*6/mm3    Hemoglobin 14.3 12.0 - 15.5 g/dL    Hematocrit 42.9 35.0 - 45.0 %    MCV 95.1 80.0 - 98.0 fL    MCH 31.7 26.5 - 34.0 pg    MCHC 33.3 31.4 - 36.0 g/dL    RDW 12.8 11.5 - 14.5 %    RDW-SD 43.3 36.4 - 46.3 fl    MPV 9.4 8.0 - 12.0 fL    Platelets 280 150 - 450 10*3/mm3    Neutrophil % 52.5 37.0 - 80.0 %    Lymphocyte % 36.9 10.0 - 50.0 %    Monocyte % 8.6 0.0 - 12.0 %    Eosinophil % 1.8 0.0 - 7.0 %    Basophil % 0.2 0.0 - 2.0 %    Neutrophils, Absolute 3.50 2.00 - 8.60 10*3/mm3    Lymphocytes, Absolute 2.46 0.60 - 4.20 10*3/mm3    Monocytes, Absolute 0.57 0.00 - 0.90 10*3/mm3    Eosinophils, Absolute 0.12 0.00 - 0.70 10*3/mm3    Basophils, Absolute 0.01 0.00 - 0.20 10*3/mm3     *Note: Due to a large number of results and/or encounters for the requested time period, some results have not been displayed. A complete set of results can be found in Results Review.

## 2019-07-10 NOTE — PATIENT INSTRUCTIONS
Irritable Bowel Syndrome, Adult  Irritable bowel syndrome (IBS) is not one specific disease. It is a group of symptoms that affects the organs responsible for digestion (gastrointestinal or GI tract).  To regulate how your GI tract works, your body sends signals back and forth between your intestines and your brain. If you have IBS, there may be a problem with these signals. As a result, your GI tract does not function normally. Your intestines may become more sensitive and overreact to certain things. This is especially true when you eat certain foods or when you are under stress.  There are four types of IBS. These may be determined based on the consistency of your stool:  · IBS with diarrhea.  · IBS with constipation.  · Mixed IBS.  · Unsubtyped IBS.    It is important to know which type of IBS you have. Some treatments are more likely to be helpful for certain types of IBS.  What are the causes?  The exact cause of IBS is not known.  What increases the risk?  You may have a higher risk of IBS if:  · You are a woman.  · You are younger than 45 years old.  · You have a family history of IBS.  · You have mental health problems.  · You have had bacterial infection of your GI tract.    What are the signs or symptoms?  Symptoms of IBS vary from person to person. The main symptom is abdominal pain or discomfort. Additional symptoms usually include one or more of the following:  · Diarrhea, constipation, or both.  · Abdominal swelling or bloating.  · Feeling full or sick after eating a small or regular-size meal.  · Frequent gas.  · Mucus in the stool.  · A feeling of having more stool left after a bowel movement.    Symptoms tend to come and go. They may be associated with stress, psychiatric conditions, or nothing at all.  How is this diagnosed?  There is no specific test to diagnose IBS. Your health care provider will make a diagnosis based on a physical exam, medical history, and your symptoms. You may have other  tests to rule out other conditions that may be causing your symptoms. These may include:  · Blood tests.  · X-rays.  · CT scan.  · Endoscopy and colonoscopy. This is a test in which your GI tract is viewed with a long, thin, flexible tube.    How is this treated?  There is no cure for IBS, but treatment can help relieve symptoms. IBS treatment often includes:  · Changes to your diet, such as:  ? Eating more fiber.  ? Avoiding foods that cause symptoms.  ? Drinking more water.  ? Eating regular, medium-sized portioned meals.  · Medicines. These may include:  ? Fiber supplements if you have constipation.  ? Medicine to control diarrhea (antidiarrheal medicines).  ? Medicine to help control muscle spasms in your GI tract (antispasmodic medicines).  ? Medicines to help with any mental health issues, such as antidepressants or tranquilizers.  · Therapy.  ? Talk therapy may help with anxiety, depression, or other mental health issues that can make IBS symptoms worse.  · Stress reduction.  ? Managing your stress can help keep symptoms under control.    Follow these instructions at home:  · Take medicines only as directed by your health care provider.  · Eat a healthy diet.  ? Avoid foods and drinks with added sugar.  ? Include more whole grains, fruits, and vegetables gradually into your diet. This may be especially helpful if you have IBS with constipation.  ? Avoid any foods and drinks that make your symptoms worse. These may include dairy products and caffeinated or carbonated drinks.  ? Do not eat large meals.  ? Drink enough fluid to keep your urine clear or pale yellow.  · Exercise regularly. Ask your health care provider for recommendations of good activities for you.  · Keep all follow-up visits as directed by your health care provider. This is important.  Contact a health care provider if:  · You have constant pain.  · You have trouble or pain with swallowing.  · You have worsening diarrhea.  Get help right away  if:  · You have severe and worsening abdominal pain.  · You have diarrhea and:  ? You have a rash, stiff neck, or severe headache.  ? You are irritable, sleepy, or difficult to awaken.  ? You are weak, dizzy, or extremely thirsty.  · You have bright red blood in your stool or you have black tarry stools.  · You have unusual abdominal swelling that is painful.  · You vomit continuously.  · You vomit blood (hematemesis).  · You have both abdominal pain and a fever.  This information is not intended to replace advice given to you by your health care provider. Make sure you discuss any questions you have with your health care provider.  Document Released: 12/18/2006 Document Revised: 05/19/2017 Document Reviewed: 09/04/2015  [employer] Interactive Patient Education © 2019 [employer]    Low-FODMAP Eating Plan  FODMAPs (fermentable oligosaccharides, disaccharides, monosaccharides, and polyols) are sugars that are hard for some people to digest. A low-FODMAP eating plan may help some people who have bowel (intestinal) diseases to manage their symptoms.  This meal plan can be complicated to follow. Work with a diet and nutrition specialist (dietitian) to make a low-FODMAP eating plan that is right for you. A dietitian can make sure that you get enough nutrition from this diet.  What are tips for following this plan?  Reading food labels  · Check labels for hidden FODMAPs such as:  ? High-fructose syrup.  ? Honey.  ? Agave.  ? Natural fruit flavors.  ? Onion or garlic powder.  · Choose low-FODMAP foods that contain 3-4 grams of fiber per serving.  · Check food labels for serving sizes. Eat only one serving at a time to make sure FODMAP levels stay low.  Meal planning  · Follow a low-FODMAP eating plan for up to 6 weeks, or as told by your health care provider or dietitian.  · To follow the eating plan:  1. Eliminate high-FODMAP foods from your diet completely.  2. Gradually reintroduce high-FODMAP foods into your diet one  at a time. Most people should wait a few days after introducing one high-FODMAP food before they introduce the next high-FODMAP food. Your dietitian can recommend how quickly you may reintroduce foods.  3. Keep a daily record of what you eat and drink, and make note of any symptoms that you have after eating.  4. Review your daily record with a dietitian regularly. Your dietitian can help you identify which foods you can eat and which foods you should avoid.  General tips  · Drink enough fluid each day to keep your urine pale yellow.  · Avoid processed foods. These often have added sugar and may be high in FODMAPs.  · Avoid most dairy products, whole grains, and sweeteners.  · Work with a dietitian to make sure you get enough fiber in your diet.  Recommended foods  Grains  · Gluten-free grains, such as rice, oats, buckwheat, quinoa, corn, polenta, and millet. Gluten-free pasta, bread, or cereal. Rice noodles. Corn tortillas.  Vegetables  · Eggplant, zucchini, cucumber, peppers, green beans, Chromo sprouts, bean sprouts, lettuce, arugula, kale, Swiss chard, spinach, pelon greens, bok sean, summer squash, potato, and tomato. Limited amounts of corn, carrot, and sweet potato. Green parts of scallions.  Fruits  · Bananas, oranges, riley, limes, blueberries, raspberries, strawberries, grapes, cantaloupe, honeydew melon, kiwi, papaya, passion fruit, and pineapple. Limited amounts of dried cranberries, banana chips, and shredded coconut.  Dairy  · Lactose-free milk, yogurt, and kefir. Lactose-free cottage cheese and ice cream. Non-dairy milks, such as almond, coconut, hemp, and rice milk. Yogurts made of non-dairy milks. Limited amounts of goat cheese, brie, mozzarella, parmesan, swiss, and other hard cheeses.  Meats and other protein foods  · Unseasoned beef, pork, poultry, or fish. Eggs. Kohli. Tofu (firm) and tempeh. Limited amounts of nuts and seeds, such as almonds, walnuts, brazil nuts, pecans, peanuts, pumpkin  "seeds, noel seeds, and sunflower seeds.  Fats and oils  · Butter-free spreads. Vegetable oils, such as olive, canola, and sunflower oil.  Seasoning and other foods  · Artificial sweeteners with names that do not end in \"ol\" such as aspartame, saccharine, and stevia. Maple syrup, white table sugar, raw sugar, brown sugar, and molasses. Fresh basil, coriander, parsley, rosemary, and thyme.  Beverages  · Water and mineral water. Sugar-sweetened soft drinks. Small amounts of orange juice or cranberry juice. Black and green tea. Most dry wil. Coffee.  This may not be a complete list of low-FODMAP foods. Talk with your dietitian for more information.  Foods to avoid  Grains  · Wheat, including kamut, durum, and semolina. Barley and bulgur. Couscous. Wheat-based cereals. Wheat noodles, bread, crackers, and pastries.  Vegetables  · Chicory root, artichoke, asparagus, cabbage, snow peas, sugar snap peas, mushrooms, and cauliflower. Onions, garlic, leeks, and the white part of scallions.  Fruits  · Fresh, dried, and juiced forms of apple, pear, watermelon, peach, plum, cherries, apricots, blackberries, boysenberries, figs, nectarines, and molly. Avocado.  Dairy  · Milk, yogurt, ice cream, and soft cheese. Cream and sour cream. Milk-based sauces. Custard.  Meats and other protein foods  · Fried or fatty meat. Sausage. Cashews and pistachios. Soybeans, baked beans, black beans, chickpeas, kidney beans, dc beans, navy beans, lentils, and split peas.  Seasoning and other foods  · Any sugar-free gum or candy. Foods that contain artificial sweeteners such as sorbitol, mannitol, isomalt, or xylitol. Foods that contain honey, high-fructose corn syrup, or agave. Bouillon, vegetable stock, beef stock, and chicken stock. Garlic and onion powder. Condiments made with onion, such as hummus, chutney, pickles, relish, salad dressing, and salsa. Tomato paste.  Beverages  · Chicory-based drinks. Coffee substitutes. Chamomile tea. Fennel " tea. Sweet or fortified wil such as port or aleena. Diet soft drinks made with isomalt, mannitol, maltitol, sorbitol, or xylitol. Apple, pear, and molly juice. Juices with high-fructose corn syrup.  This may not be a complete list of high-FODMAP foods. Talk with your dietitian to discuss what dietary choices are best for you.   Summary  · A low-FODMAP eating plan is a short-term diet that eliminates FODMAPs from your diet to help ease symptoms of certain bowel diseases.  · The eating plan usually lasts up to 6 weeks. After that, high-FODMAP foods are restarted gradually, one at a time, so you can find out which may be causing symptoms.  · A low-FODMAP eating plan can be complicated. It is best to work with a dietitian who has experience with this type of plan.  This information is not intended to replace advice given to you by your health care provider. Make sure you discuss any questions you have with your health care provider.  Document Released: 08/14/2018 Document Revised: 08/14/2018 Document Reviewed: 08/14/2018  [employer] Interactive Patient Education © 2019 [employer]

## 2019-07-23 RX ORDER — APIXABAN 2.5 MG/1
2.5 TABLET, FILM COATED ORAL 2 TIMES DAILY
Qty: 60 TABLET | Refills: 2 | Status: SHIPPED | OUTPATIENT
Start: 2019-07-23 | End: 2019-07-24 | Stop reason: SDUPTHER

## 2019-07-24 RX ORDER — APIXABAN 2.5 MG/1
2.5 TABLET, FILM COATED ORAL 2 TIMES DAILY
Qty: 60 TABLET | Refills: 5 | Status: SHIPPED | OUTPATIENT
Start: 2019-07-24 | End: 2019-10-22 | Stop reason: SDUPTHER

## 2019-09-04 ENCOUNTER — OFFICE VISIT (OUTPATIENT)
Dept: FAMILY MEDICINE CLINIC | Facility: CLINIC | Age: 84
End: 2019-09-04

## 2019-09-04 VITALS
WEIGHT: 130.2 LBS | HEIGHT: 63 IN | HEART RATE: 78 BPM | TEMPERATURE: 98.2 F | DIASTOLIC BLOOD PRESSURE: 70 MMHG | SYSTOLIC BLOOD PRESSURE: 130 MMHG | BODY MASS INDEX: 23.07 KG/M2

## 2019-09-04 DIAGNOSIS — I10 ESSENTIAL HYPERTENSION: Chronic | ICD-10-CM

## 2019-09-04 DIAGNOSIS — M79.89 LOCALIZED SWELLING OF BOTH LOWER EXTREMITIES: ICD-10-CM

## 2019-09-04 DIAGNOSIS — Z72.0 TOBACCO USER: Chronic | ICD-10-CM

## 2019-09-04 DIAGNOSIS — L30.9 DERMATITIS: Primary | ICD-10-CM

## 2019-09-04 PROCEDURE — 96372 THER/PROPH/DIAG INJ SC/IM: CPT | Performed by: NURSE PRACTITIONER

## 2019-09-04 PROCEDURE — 99214 OFFICE O/P EST MOD 30 MIN: CPT | Performed by: NURSE PRACTITIONER

## 2019-09-04 RX ORDER — FUROSEMIDE 20 MG/1
20 TABLET ORAL DAILY
Qty: 30 TABLET | Refills: 0 | Status: SHIPPED | OUTPATIENT
Start: 2019-09-04 | End: 2020-10-01

## 2019-09-04 RX ORDER — METHYLPREDNISOLONE ACETATE 80 MG/ML
80 INJECTION, SUSPENSION INTRA-ARTICULAR; INTRALESIONAL; INTRAMUSCULAR; SOFT TISSUE ONCE
Status: COMPLETED | OUTPATIENT
Start: 2019-09-04 | End: 2019-09-04

## 2019-09-04 RX ADMIN — METHYLPREDNISOLONE ACETATE 80 MG: 80 INJECTION, SUSPENSION INTRA-ARTICULAR; INTRALESIONAL; INTRAMUSCULAR; SOFT TISSUE at 14:59

## 2019-09-04 NOTE — PROGRESS NOTES
Subjective   Annika Kimball is a 83 y.o. female. Patient here today with complaints of Sinusitis and Rash  Patient here today with daughter complaining of bilateral lower extremity redness and swelling off and on for the last month, also reports rash to same area and rash to right upper extremity, itching, has complains of decreased hearing, ear congestion clear rhinorrhea and ear pain at times.  States when her legs or swelling previously she was on a diuretic which helped but then when she ran out of medication she never restarted it, only took as needed at that point, symptoms worsened when she was off of medication.  Denies chest pain shortness of breath headache.  She does have history of hypertension.  She continues to smoke.    Vitals:    09/04/19 1411   BP: 130/70   Pulse: 78   Temp: 98.2 °F (36.8 °C)     Past Medical History:   Diagnosis Date   • Acute bronchitis    • Acute sinusitis    • Dizziness    • Dyslipidemia    • Dysuria    • Essential hypertension    • Gastroesophageal reflux disease    • Generalized abdominal pain    • Tobacco user      Rash   This is a recurrent problem. The current episode started 1 to 4 weeks ago. The problem is unchanged. The affected locations include the right arm, left lower leg and right lower leg. The rash is characterized by redness, itchiness and swelling. It is unknown if there was an exposure to a precipitant. Associated symptoms include congestion and rhinorrhea. Pertinent negatives include no shortness of breath. Past treatments include nothing. The treatment provided no relief.   Hypertension   This is a chronic problem. The current episode started more than 1 year ago. The problem is unchanged. The problem is controlled. Associated symptoms include peripheral edema. Pertinent negatives include no chest pain or shortness of breath. Risk factors for coronary artery disease include smoking/tobacco exposure and post-menopausal state. Past treatments include  lifestyle changes and diuretics. Current antihypertension treatment includes lifestyle changes and diuretics. The current treatment provides mild improvement. Compliance problems include exercise, diet and psychosocial issues.    Leg Swelling   This is a new problem. The current episode started more than 1 month ago. The problem occurs intermittently. The problem has been waxing and waning. Associated symptoms include congestion and a rash. Pertinent negatives include no chest pain. The symptoms are aggravated by standing and walking. She has tried sleep, rest and relaxation for the symptoms. The treatment provided no relief.        The following portions of the patient's history were reviewed and updated as appropriate: allergies, current medications, past family history, past medical history, past social history, past surgical history and problem list.    Review of Systems   Constitutional: Negative.    HENT: Positive for congestion and rhinorrhea.    Eyes: Negative.    Respiratory: Negative.  Negative for shortness of breath.    Cardiovascular: Positive for leg swelling. Negative for chest pain.   Gastrointestinal: Negative.    Endocrine: Negative.    Genitourinary: Negative.    Musculoskeletal: Negative.    Skin: Positive for rash.   Allergic/Immunologic: Negative.    Neurological: Negative.    Hematological: Negative.    Psychiatric/Behavioral: Negative.        Objective   Physical Exam   Constitutional: She is oriented to person, place, and time. She appears well-developed and well-nourished. No distress.   HENT:   Head: Normocephalic and atraumatic.   Cardiovascular: Normal rate, regular rhythm and normal heart sounds. Exam reveals no gallop and no friction rub.   No murmur heard.  Pulmonary/Chest: Effort normal and breath sounds normal. No stridor. No respiratory distress. She has no wheezes. She has no rales.   Musculoskeletal: She exhibits edema (trace pitting edema noted BLE ).   Neurological: She is  alert and oriented to person, place, and time.   Skin: Skin is warm and dry. Petechiae and rash noted. She is not diaphoretic. There is erythema. No pallor.        Psychiatric: She has a normal mood and affect.   Nursing note and vitals reviewed.      Assessment/Plan   Annika was seen today for sinusitis and rash.    Diagnoses and all orders for this visit:    Dermatitis  -     methylPREDNISolone acetate (DEPO-medrol) injection 80 mg    Essential hypertension    Tobacco user    Localized swelling of both lower extremities    Other orders  -     furosemide (LASIX) 20 MG tablet; Take 1 tablet by mouth Daily.    She is given Depo-Medrol 80 mg injection IM in office today, started on Lasix and asked to take this as needed for fluid retention, when she takes Lasix she is advised to increase potassium in her diet including raisins, orange juice, bananas, etc.  If symptoms do not resolve she is asked to return here for recheck otherwise I will see her back as scheduled for chronic conditions.  She is aware and is in agreement to this plan.  All questions and concerns are addressed with understanding noted. Once again advised to stop smoking which she declines to do.

## 2019-09-09 DIAGNOSIS — E78.5 HYPERLIPIDEMIA, UNSPECIFIED HYPERLIPIDEMIA TYPE: Primary | ICD-10-CM

## 2019-09-13 LAB
ALBUMIN SERPL-MCNC: 4.2 G/DL (ref 3.5–5)
ALBUMIN/GLOB SERPL: 1.4 G/DL (ref 1.1–1.8)
ALP SERPL-CCNC: 71 U/L (ref 38–126)
ALT SERPL W P-5'-P-CCNC: 11 U/L
ANION GAP SERPL CALCULATED.3IONS-SCNC: 8 MMOL/L (ref 5–15)
AST SERPL-CCNC: 21 U/L (ref 14–36)
BASOPHILS # BLD AUTO: 0.02 10*3/MM3 (ref 0–0.2)
BASOPHILS NFR BLD AUTO: 0.3 % (ref 0–1.5)
BILIRUB SERPL-MCNC: 1.6 MG/DL (ref 0.2–1.3)
BUN BLD-MCNC: 16 MG/DL (ref 7–23)
BUN/CREAT SERPL: 16.5 (ref 7–25)
CALCIUM SPEC-SCNC: 9.6 MG/DL (ref 8.4–10.2)
CHLORIDE SERPL-SCNC: 104 MMOL/L (ref 101–112)
CHOLEST SERPL-MCNC: 229 MG/DL (ref 150–200)
CO2 SERPL-SCNC: 28 MMOL/L (ref 22–30)
CREAT BLD-MCNC: 0.97 MG/DL (ref 0.52–1.04)
DEPRECATED RDW RBC AUTO: 44.3 FL (ref 37–54)
EOSINOPHIL # BLD AUTO: 0.24 10*3/MM3 (ref 0–0.4)
EOSINOPHIL NFR BLD AUTO: 3.8 % (ref 0.3–6.2)
ERYTHROCYTE [DISTWIDTH] IN BLOOD BY AUTOMATED COUNT: 13.3 % (ref 12.3–15.4)
GFR SERPL CREATININE-BSD FRML MDRD: 55 ML/MIN/1.73 (ref 39–90)
GLOBULIN UR ELPH-MCNC: 2.9 GM/DL (ref 2.3–3.5)
GLUCOSE BLD-MCNC: 104 MG/DL (ref 70–99)
HCT VFR BLD AUTO: 41.2 % (ref 34–46.6)
HDLC SERPL-MCNC: 79 MG/DL (ref 40–59)
HGB BLD-MCNC: 13.9 G/DL (ref 12–15.9)
LDLC SERPL CALC-MCNC: 136 MG/DL
LDLC/HDLC SERPL: 1.72 {RATIO} (ref 0–3.22)
LYMPHOCYTES # BLD AUTO: 2.49 10*3/MM3 (ref 0.7–3.1)
LYMPHOCYTES NFR BLD AUTO: 39.8 % (ref 19.6–45.3)
MCH RBC QN AUTO: 31.7 PG (ref 26.6–33)
MCHC RBC AUTO-ENTMCNC: 33.7 G/DL (ref 31.5–35.7)
MCV RBC AUTO: 94.1 FL (ref 79–97)
MONOCYTES # BLD AUTO: 0.5 10*3/MM3 (ref 0.1–0.9)
MONOCYTES NFR BLD AUTO: 8 % (ref 5–12)
NEUTROPHILS # BLD AUTO: 3.01 10*3/MM3 (ref 1.7–7)
NEUTROPHILS NFR BLD AUTO: 48.1 % (ref 42.7–76)
PLATELET # BLD AUTO: 247 10*3/MM3 (ref 140–450)
PMV BLD AUTO: 9.8 FL (ref 6–12)
POTASSIUM BLD-SCNC: 4.4 MMOL/L (ref 3.4–5)
PROT SERPL-MCNC: 7.1 G/DL (ref 6.3–8.6)
RBC # BLD AUTO: 4.38 10*6/MM3 (ref 3.77–5.28)
SODIUM BLD-SCNC: 140 MMOL/L (ref 137–145)
TRIGL SERPL-MCNC: 70 MG/DL
VLDLC SERPL-MCNC: 14 MG/DL
WBC NRBC COR # BLD: 6.26 10*3/MM3 (ref 3.4–10.8)

## 2019-09-13 PROCEDURE — 84481 FREE ASSAY (FT-3): CPT | Performed by: NURSE PRACTITIONER

## 2019-09-13 PROCEDURE — 84443 ASSAY THYROID STIM HORMONE: CPT | Performed by: NURSE PRACTITIONER

## 2019-09-13 PROCEDURE — 36415 COLL VENOUS BLD VENIPUNCTURE: CPT | Performed by: NURSE PRACTITIONER

## 2019-09-13 PROCEDURE — 80053 COMPREHEN METABOLIC PANEL: CPT | Performed by: NURSE PRACTITIONER

## 2019-09-13 PROCEDURE — 84436 ASSAY OF TOTAL THYROXINE: CPT | Performed by: NURSE PRACTITIONER

## 2019-09-13 PROCEDURE — 80061 LIPID PANEL: CPT | Performed by: NURSE PRACTITIONER

## 2019-09-13 PROCEDURE — 85025 COMPLETE CBC W/AUTO DIFF WBC: CPT | Performed by: NURSE PRACTITIONER

## 2019-09-14 LAB
T3FREE SERPL-MCNC: 3.02 PG/ML (ref 2–4.4)
T4 SERPL-MCNC: 8.72 MCG/DL (ref 4.5–11.7)
TSH SERPL DL<=0.05 MIU/L-ACNC: 3.66 UIU/ML (ref 0.27–4.2)

## 2019-09-17 DIAGNOSIS — H54.7 VISION LOSS: Primary | ICD-10-CM

## 2019-10-22 RX ORDER — APIXABAN 2.5 MG/1
2.5 TABLET, FILM COATED ORAL 2 TIMES DAILY
Qty: 60 TABLET | Refills: 5 | Status: SHIPPED | OUTPATIENT
Start: 2019-10-22 | End: 2020-10-01

## 2019-12-11 ENCOUNTER — LAB (OUTPATIENT)
Dept: LAB | Facility: OTHER | Age: 84
End: 2019-12-11

## 2019-12-11 ENCOUNTER — OFFICE VISIT (OUTPATIENT)
Dept: FAMILY MEDICINE CLINIC | Facility: CLINIC | Age: 84
End: 2019-12-11

## 2019-12-11 VITALS
SYSTOLIC BLOOD PRESSURE: 118 MMHG | OXYGEN SATURATION: 95 % | TEMPERATURE: 98.6 F | WEIGHT: 128 LBS | BODY MASS INDEX: 22.68 KG/M2 | HEIGHT: 63 IN | HEART RATE: 100 BPM | DIASTOLIC BLOOD PRESSURE: 74 MMHG

## 2019-12-11 DIAGNOSIS — H65.03 NON-RECURRENT ACUTE SEROUS OTITIS MEDIA OF BOTH EARS: ICD-10-CM

## 2019-12-11 DIAGNOSIS — J06.9 ACUTE URI: Primary | ICD-10-CM

## 2019-12-11 DIAGNOSIS — R35.0 FREQUENCY OF URINATION: ICD-10-CM

## 2019-12-11 DIAGNOSIS — N76.0 ACUTE VAGINITIS: ICD-10-CM

## 2019-12-11 LAB
BACTERIA UR QL AUTO: ABNORMAL /HPF
BILIRUB UR QL STRIP: NEGATIVE
CLARITY UR: CLEAR
COLOR UR: YELLOW
GLUCOSE UR STRIP-MCNC: NEGATIVE MG/DL
HGB UR QL STRIP.AUTO: ABNORMAL
HYALINE CASTS UR QL AUTO: ABNORMAL /LPF
KETONES UR QL STRIP: ABNORMAL
LEUKOCYTE ESTERASE UR QL STRIP.AUTO: NEGATIVE
NITRITE UR QL STRIP: NEGATIVE
PH UR STRIP.AUTO: 6.5 [PH] (ref 5.5–8)
PROT UR QL STRIP: NEGATIVE
RBC # UR: ABNORMAL /HPF
REF LAB TEST METHOD: ABNORMAL
SP GR UR STRIP: 1.01 (ref 1–1.03)
SQUAMOUS #/AREA URNS HPF: ABNORMAL /HPF
UROBILINOGEN UR QL STRIP: ABNORMAL
WBC UR QL AUTO: ABNORMAL /HPF

## 2019-12-11 PROCEDURE — 87086 URINE CULTURE/COLONY COUNT: CPT | Performed by: NURSE PRACTITIONER

## 2019-12-11 PROCEDURE — 99213 OFFICE O/P EST LOW 20 MIN: CPT | Performed by: NURSE PRACTITIONER

## 2019-12-11 PROCEDURE — 81001 URINALYSIS AUTO W/SCOPE: CPT | Performed by: NURSE PRACTITIONER

## 2019-12-11 RX ORDER — METHYLPREDNISOLONE 4 MG/1
TABLET ORAL
Qty: 1 EACH | Refills: 0 | Status: SHIPPED | OUTPATIENT
Start: 2019-12-11 | End: 2020-10-01

## 2019-12-11 RX ORDER — BROMPHENIRAMINE MALEATE, PSEUDOEPHEDRINE HYDROCHLORIDE, AND DEXTROMETHORPHAN HYDROBROMIDE 2; 30; 10 MG/5ML; MG/5ML; MG/5ML
5 SYRUP ORAL 4 TIMES DAILY PRN
Qty: 118 ML | Refills: 0 | Status: SHIPPED | OUTPATIENT
Start: 2019-12-11 | End: 2020-10-01

## 2019-12-11 RX ORDER — FLUCONAZOLE 150 MG/1
150 TABLET ORAL ONCE
Qty: 1 TABLET | Refills: 0 | Status: SHIPPED | OUTPATIENT
Start: 2019-12-11 | End: 2019-12-11

## 2019-12-11 NOTE — PROGRESS NOTES
"Subjective   Annika Kimball is a 84 y.o. female. Patient here today with complaints of Nausea (cough,patient states that she has had symptoms for 4weeks); Vomiting; and Urinary Frequency (burning, itching,  patient was on an antibiotic in [facility], )  pt here today with daughter complaining of ear congestion bilat, is dizzy at times worse with bending, she has had cough until she vomits and is producing clear sputum, denies fever. [facility] been seen [facility] and was given omnicef and steroid inj which did not resolve her problems but did cause vaginal itching and burning with urination. She has had sick contacts. stmptoms started 3 weeks ago     Vitals:    12/11/19 1051   BP: 118/74   Pulse: 100   Temp: 98.6 °F (37 °C)   TempSrc: Oral   SpO2: 95%   Weight: 58.1 kg (128 lb)   Height: 160 cm (63\")   PainSc: 0-No pain     Body mass index is 22.67 kg/m².  Past Medical History:   Diagnosis Date   • Acute bronchitis    • Acute sinusitis    • Dizziness    • Dyslipidemia    • Dysuria    • Essential hypertension    • Gastroesophageal reflux disease    • Generalized abdominal pain    • Tobacco user      URI    This is a new problem. The current episode started 1 to 4 weeks ago. The problem has been gradually improving. Associated symptoms include congestion, coughing, nausea and a plugged ear sensation. Pertinent negatives include no sinus pain. Treatments tried: omnicef and steroid inj  The treatment provided mild relief.   Vaginitis   This is a new problem. The current episode started in the past 7 days. The problem occurs constantly. The problem has been unchanged. Associated symptoms include congestion, coughing and nausea. Exacerbated by: antibiotics. She has tried nothing for the symptoms. The treatment provided no relief.        The following portions of the patient's history were reviewed and updated as appropriate: allergies, current medications, past family history, past medical history, past social history, past " surgical history and problem list.    Review of Systems   Constitutional: Negative.    HENT: Positive for congestion. Negative for sinus pain.    Eyes: Negative.    Respiratory: Positive for cough.    Cardiovascular: Negative.    Gastrointestinal: Positive for nausea.   Endocrine: Negative.    Genitourinary: Negative.    Musculoskeletal: Negative.    Skin: Negative.    Allergic/Immunologic: Negative.    Neurological: Negative.    Hematological: Negative.    Psychiatric/Behavioral: Negative.        Objective   Physical Exam   Constitutional: She is oriented to person, place, and time. She appears well-developed and well-nourished. No distress.   HENT:   Head: Normocephalic and atraumatic.   Right Ear: Hearing, external ear and ear canal normal. Tympanic membrane is injected, scarred and bulging.   Left Ear: Hearing, external ear and ear canal normal. Tympanic membrane is bulging.   Nose: Right sinus exhibits no maxillary sinus tenderness and no frontal sinus tenderness. Left sinus exhibits no maxillary sinus tenderness and no frontal sinus tenderness.   Mouth/Throat: Uvula is midline and mucous membranes are normal. Posterior oropharyngeal erythema present. No tonsillar exudate.   Neck: Neck supple.   Cardiovascular: Normal rate, regular rhythm and normal heart sounds. Exam reveals no gallop and no friction rub.   No murmur heard.  Pulmonary/Chest: Effort normal and breath sounds normal. No stridor. No respiratory distress. She has no wheezes. She has no rales.   Abdominal: Soft. Bowel sounds are normal. She exhibits no distension and no mass. There is no tenderness. There is no rigidity, no guarding and no CVA tenderness.   Lymphadenopathy:     She has no cervical adenopathy.   Neurological: She is alert and oriented to person, place, and time.   Skin: Skin is warm and dry. No rash noted. She is not diaphoretic. No erythema. No pallor.   Psychiatric: She has a normal mood and affect. Her behavior is normal.    Nursing note and vitals reviewed.      Assessment/Plan   Annika was seen today for nausea, vomiting and urinary frequency.    Diagnoses and all orders for this visit:    Acute URI    Frequency of urination  -     Urinalysis With Culture If Indicated - Urine, Clean Catch; Future    Acute vaginitis    Non-recurrent acute serous otitis media of both ears    Other orders  -     methylPREDNISolone (MEDROL, JEFFERY,) 4 MG tablet; Take as directed on package instructions.  -     brompheniramine-pseudoephedrine-DM 30-2-10 MG/5ML syrup; Take 5 mL by mouth 4 (Four) Times a Day As Needed for Congestion or Cough.  -     fluconazole (DIFLUCAN) 150 MG tablet; Take 1 tablet by mouth 1 (One) Time for 1 dose.    UA obtained based on patient's symptoms  UA results neg for infection and they are made aware of this  Will treat with medrol , bromfed and diflucan as above  If symptoms persist or worsen she is asked to [facility] for recheck  They are aware and are in agreement to this plan  All questions and concerns are addressed with understanding noted.

## 2019-12-13 LAB — BACTERIA SPEC AEROBE CULT: ABNORMAL

## 2019-12-30 RX ORDER — CEFDINIR 300 MG/1
300 CAPSULE ORAL 2 TIMES DAILY
Qty: 20 CAPSULE | Refills: 0 | Status: SHIPPED | OUTPATIENT
Start: 2019-12-30 | End: 2020-10-01

## 2020-01-21 DIAGNOSIS — M79.604 PAIN OF RIGHT LOWER EXTREMITY: ICD-10-CM

## 2020-01-21 DIAGNOSIS — I82.90 BLOOD CLOT IN VEIN: ICD-10-CM

## 2020-01-21 DIAGNOSIS — I82.90 THROMBUS: Primary | ICD-10-CM

## 2020-10-01 ENCOUNTER — OFFICE VISIT (OUTPATIENT)
Dept: FAMILY MEDICINE CLINIC | Facility: CLINIC | Age: 85
End: 2020-10-01

## 2020-10-01 VITALS
BODY MASS INDEX: 23.78 KG/M2 | TEMPERATURE: 96.7 F | HEART RATE: 99 BPM | DIASTOLIC BLOOD PRESSURE: 82 MMHG | HEIGHT: 63 IN | SYSTOLIC BLOOD PRESSURE: 140 MMHG | WEIGHT: 134.2 LBS

## 2020-10-01 DIAGNOSIS — E78.5 DYSLIPIDEMIA: Chronic | ICD-10-CM

## 2020-10-01 DIAGNOSIS — Z09 HOSPITAL DISCHARGE FOLLOW-UP: Primary | ICD-10-CM

## 2020-10-01 DIAGNOSIS — H91.93 DECREASED HEARING OF BOTH EARS: ICD-10-CM

## 2020-10-01 DIAGNOSIS — I11.0 HYPERTENSIVE HEART DISEASE WITH HEART FAILURE (HCC): ICD-10-CM
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
## 2020-10-01 DIAGNOSIS — Z72.0 TOBACCO USER: Chronic | ICD-10-CM

## 2020-10-01 DIAGNOSIS — I10 ESSENTIAL HYPERTENSION: Chronic | ICD-10-CM

## 2020-10-01 DIAGNOSIS — R53.1 WEAKNESS: ICD-10-CM

## 2020-10-01 PROCEDURE — 99214 OFFICE O/P EST MOD 30 MIN: CPT | Performed by: NURSE PRACTITIONER

## 2020-10-01 RX ORDER — ENALAPRIL MALEATE 10 MG/1
10 TABLET ORAL DAILY
Qty: 30 TABLET | Refills: 2 | Status: SHIPPED | OUTPATIENT
Start: 2020-10-01 | End: 2021-06-15

## 2020-10-01 RX ORDER — ASPIRIN 81 MG/1
81 TABLET, CHEWABLE ORAL DAILY
COMMUNITY
Start: 2020-09-26 | End: 2021-03-25

## 2020-10-01 RX ORDER — ATORVASTATIN CALCIUM 40 MG/1
TABLET, FILM COATED ORAL
COMMUNITY
Start: 2020-09-26 | End: 2021-06-15 | Stop reason: SINTOL

## 2020-10-01 RX ORDER — LEVETIRACETAM 750 MG/1
750 TABLET ORAL
COMMUNITY
Start: 2020-09-25 | End: 2021-03-29 | Stop reason: SDUPTHER

## 2020-10-29 NOTE — PROGRESS NOTES
"Subjective   Annika Kimball is a 85 y.o. female. Patient here today with complaints of Follow-up  Patient here today with daughter for hospital follow-up.  Daughter reports that last Thursday morning she fell and hit her right leg on the door frame, at that time she could not use her right side upper or lower extremity she was numb and weak on both sides.  She after a few minutes did feel better got up to go to the bathroom and had another \"spell \"where she became numb on her right side again.  Daughter called 911 she was flown to [facility] where she had CT of head labs urinalysis and was kept for observation, does report MRI as well.  She was started on Keppra and did not have any follow-up scheduled outpatient with neurology.  She does incidentally have decreased hearing bilaterally and is requesting referral to ENT.  She is continuing to smoke and says that since this episode last week she has been feeling fine she has been walking and does not desire to quit smoking.  BP up when standing, reports blood sugar up in the hospital as well.  Daughter also relates that she was told she had 50% blockage in carotid artery, in addition to testing in hospital she had an echo.  Records from [facility] are reviewed.    Vitals:    10/01/20 1428   BP: 140/82   Pulse: 99   Temp: 96.7 °F (35.9 °C)   Weight: 60.9 kg (134 lb 3.2 oz)   Height: 160 cm (63\")   PainSc: 0-No pain     Body mass index is 23.77 kg/m².  Past Medical History:   Diagnosis Date   • Acute bronchitis    • Acute sinusitis    • Dizziness    • Dyslipidemia    • Dysuria    • Essential hypertension    • Gastroesophageal reflux disease    • Generalized abdominal pain    • Tobacco user      History of Present Illness     The following portions of the patient's history were reviewed and updated as appropriate: allergies, current medications, past family history, past medical history, past social history, past surgical history and problem " list.    Review of Systems   Constitutional: Negative.    HENT: Negative.    Eyes: Negative.    Respiratory: Negative.    Cardiovascular: Negative.    Gastrointestinal: Negative.    Endocrine: Negative.    Genitourinary: Negative.    Musculoskeletal: Negative.    Skin: Negative.    Allergic/Immunologic: Negative.    Neurological: Negative.    Hematological: Negative.    Psychiatric/Behavioral: Negative.        Objective   Physical Exam  Vitals signs and nursing note reviewed.   Constitutional:       General: She is not in acute distress.     Appearance: Normal appearance. She is not ill-appearing, toxic-appearing or diaphoretic.   HENT:      Head: Normocephalic and atraumatic.   Neck:      Vascular: No carotid bruit.   Cardiovascular:      Rate and Rhythm: Normal rate and regular rhythm.      Heart sounds: Normal heart sounds. No murmur. No friction rub. No gallop.    Pulmonary:      Effort: Pulmonary effort is normal. No respiratory distress.      Breath sounds: No stridor. Examination of the right-upper field reveals decreased breath sounds. Examination of the left-upper field reveals decreased breath sounds. Examination of the right-middle field reveals decreased breath sounds. Examination of the left-middle field reveals decreased breath sounds. Examination of the right-lower field reveals decreased breath sounds. Examination of the left-lower field reveals decreased breath sounds. Decreased breath sounds present. No wheezing, rhonchi or rales.   Skin:     General: Skin is warm and dry.      Coloration: Skin is not jaundiced or pale.      Findings: No bruising, erythema, lesion or rash.   Neurological:      Mental Status: She is alert and oriented to person, place, and time.      Coordination: Coordination normal.      Gait: Gait normal.   Psychiatric:         Mood and Affect: Mood normal.         Behavior: Behavior normal.         Thought Content: Thought content normal.         Judgment: Judgment normal.          Assessment/Plan   Diagnoses and all orders for this visit:    1. Hospital discharge follow-up (Primary)    2. Decreased hearing of both ears  -     Ambulatory Referral to ENT (Otolaryngology)    3. Fall, subsequent encounter    4. Weakness    5. Essential hypertension    6. Hypertensive heart disease with heart failure     7. Tobacco user    8. Dyslipidemia    Other orders  -     enalapril (Vasotec) 10 MG tablet; Take 1 tablet by mouth Daily.  Dispense: 30 tablet; Refill: 2    Records from [facility] are reviewed, may consider referral to neurology, at present continue on Keppra, cerumen irrigated from right ear, can refer to ENT as patient requests.  She will be given refills on enalapril 10 mg daily, advised to monitor symptoms closely and should she have any symptoms as she did previously with unilateral weakness etc. she is to go on to emergency room for evaluation.  They are aware and in agreement to this plan.  Once again she is encouraged to stop smoking but declines.  All questions and concerns are addressed with understanding verbalized.

## 2020-12-07 RX ORDER — BROMPHENIRAMINE MALEATE, PSEUDOEPHEDRINE HYDROCHLORIDE, AND DEXTROMETHORPHAN HYDROBROMIDE 2; 30; 10 MG/5ML; MG/5ML; MG/5ML
5 SYRUP ORAL 4 TIMES DAILY PRN
Qty: 118 ML | Refills: 0 | Status: SHIPPED | OUTPATIENT
Start: 2020-12-07 | End: 2021-05-03

## 2020-12-07 RX ORDER — AMOXICILLIN 500 MG/1
500 CAPSULE ORAL 2 TIMES DAILY
Qty: 20 CAPSULE | Refills: 0 | Status: SHIPPED | OUTPATIENT
Start: 2020-12-07 | End: 2020-12-17

## 2021-03-29 RX ORDER — LEVETIRACETAM 750 MG/1
750 TABLET ORAL 2 TIMES DAILY
Qty: 60 TABLET | Refills: 6 | Status: SHIPPED | OUTPATIENT
Start: 2021-03-29 | End: 2021-10-25

## 2021-05-03 ENCOUNTER — LAB (OUTPATIENT)
Dept: LAB | Facility: OTHER | Age: 86
End: 2021-05-03

## 2021-05-03 ENCOUNTER — OFFICE VISIT (OUTPATIENT)
Dept: FAMILY MEDICINE CLINIC | Facility: CLINIC | Age: 86
End: 2021-05-03

## 2021-05-03 VITALS
WEIGHT: 138 LBS | HEIGHT: 63 IN | OXYGEN SATURATION: 95 % | TEMPERATURE: 101.6 F | HEART RATE: 106 BPM | SYSTOLIC BLOOD PRESSURE: 155 MMHG | BODY MASS INDEX: 24.45 KG/M2 | DIASTOLIC BLOOD PRESSURE: 79 MMHG

## 2021-05-03 DIAGNOSIS — R05.9 COUGH: ICD-10-CM

## 2021-05-03 DIAGNOSIS — R50.9 FEVER, UNSPECIFIED FEVER CAUSE: ICD-10-CM

## 2021-05-03 DIAGNOSIS — R09.81 NASAL CONGESTION: ICD-10-CM

## 2021-05-03 DIAGNOSIS — R05.9 COUGH: Primary | ICD-10-CM

## 2021-05-03 LAB
FLUAV AG NPH QL: NEGATIVE
FLUBV AG NPH QL IA: NEGATIVE
SARS-COV-2 N GENE RESP QL NAA+PROBE: NOT DETECTED

## 2021-05-03 PROCEDURE — 99213 OFFICE O/P EST LOW 20 MIN: CPT | Performed by: NURSE PRACTITIONER

## 2021-05-03 PROCEDURE — 87804 INFLUENZA ASSAY W/OPTIC: CPT | Performed by: NURSE PRACTITIONER

## 2021-05-03 PROCEDURE — 87635 SARS-COV-2 COVID-19 AMP PRB: CPT | Performed by: NURSE PRACTITIONER

## 2021-05-03 RX ORDER — BROMPHENIRAMINE MALEATE, PSEUDOEPHEDRINE HYDROCHLORIDE, AND DEXTROMETHORPHAN HYDROBROMIDE 2; 30; 10 MG/5ML; MG/5ML; MG/5ML
5 SYRUP ORAL 4 TIMES DAILY PRN
Qty: 118 ML | Refills: 0 | Status: SHIPPED | OUTPATIENT
Start: 2021-05-03 | End: 2021-06-15

## 2021-05-03 RX ORDER — AZITHROMYCIN 250 MG/1
TABLET, FILM COATED ORAL
Qty: 6 TABLET | Refills: 0 | Status: SHIPPED | OUTPATIENT
Start: 2021-05-03 | End: 2021-06-15

## 2021-05-03 NOTE — PROGRESS NOTES
"Chief Complaint  Cough (x1 week) and Sinusitis    Subjective          Annika Kimball presents to [facility]  Cough  This is a new problem. The current episode started in the past 7 days. The problem has been gradually worsening. The problem occurs every few minutes. The cough is productive of sputum. Associated symptoms include a fever, nasal congestion, postnasal drip, rhinorrhea and wheezing. Risk factors for lung disease include smoking/tobacco exposure. She has tried nothing for the symptoms. The treatment provided no relief. Her past medical history is significant for bronchitis. There is no history of pneumonia.     Pt here today with daughter with complaints of nasal congestion, productive cough of clear sputum, symptoms started last week. Does report wheezing, denies hx of pneumonia but does report hx of bronchitis. Denies fever. Has already had both covid 19 vaccines.   Objective   Vital Signs:   /79   Pulse 106   Temp (!) 101.6 °F (38.7 °C)   Ht 160 cm (63\")   Wt 62.6 kg (138 lb)   SpO2 95%   BMI 24.45 kg/m²     Physical Exam  Vitals and nursing note reviewed.   Constitutional:       General: She is not in acute distress.     Appearance: Normal appearance. She is not ill-appearing, toxic-appearing or diaphoretic.   HENT:      Head: Normocephalic and atraumatic.      Right Ear: Tympanic membrane and ear canal normal. There is no impacted cerumen.      Left Ear: Tympanic membrane and ear canal normal. There is no impacted cerumen.      Nose: Congestion and rhinorrhea present.      Mouth/Throat:      Mouth: Mucous membranes are moist.   Cardiovascular:      Rate and Rhythm: Normal rate and regular rhythm.      Heart sounds: Normal heart sounds. No murmur heard.   No friction rub. No gallop.    Pulmonary:      Effort: Pulmonary effort is normal. No respiratory distress.      Breath sounds: No stridor. Examination of the right-upper field reveals decreased breath " sounds. Examination of the left-upper field reveals decreased breath sounds. Examination of the right-middle field reveals decreased breath sounds. Examination of the left-middle field reveals decreased breath sounds. Examination of the right-lower field reveals decreased breath sounds. Examination of the left-lower field reveals decreased breath sounds. Decreased breath sounds present. No wheezing, rhonchi or rales.      Comments: Pulse ox on RA 95%  Skin:     General: Skin is warm and dry.   Neurological:      Mental Status: She is alert and oriented to person, place, and time.   Psychiatric:         Mood and Affect: Mood normal.         Behavior: Behavior normal.         Thought Content: Thought content normal.         Judgment: Judgment normal.        Result Review :                 Assessment and Plan    Diagnoses and all orders for this visit:    1. Cough (Primary)  -     COVID-19, [facility] IN-HOUSE, NP SWAB IN TRANSPORT MEDIA 8-10 HR TAT - Swab, Anterior nasal; Future  -     Influenza Antigen, Rapid - Swab, Nasopharynx    2. Fever, unspecified fever cause  -     COVID-19, [facility] IN-HOUSE, NP SWAB IN TRANSPORT MEDIA 8-10 HR TAT - Swab, Anterior nasal; Future  -     Influenza Antigen, Rapid - Swab, Nasopharynx    3. Nasal congestion  -     COVID-19, [facility] IN-HOUSE, NP SWAB IN TRANSPORT MEDIA 8-10 HR TAT - Swab, Anterior nasal; Future  -     Influenza Antigen, Rapid - Swab, Nasopharynx    Other orders  -     brompheniramine-pseudoephedrine-DM 30-2-10 MG/5ML syrup; Take 5 mL by mouth 4 (Four) Times a Day As Needed for Congestion or Cough.  Dispense: 118 mL; Refill: 0  -     azithromycin (Zithromax Z-Billy) 250 MG tablet; Take 2 po now and 1 po d2-5  Dispense: 6 tablet; Refill: 0    pt is swabbed for flu and covid, will inform of results via phone, advised to quarantine until results return. Will treat with bromfed, zithromax as above, if neg for covid and flu will likely have her come in for CXR and labs. They are  aware and are in agreement to this plan. All questions and concerns are addressed with understanding noted. Advised cool fluids, rest and tylenol prn fever or pain. They are aware.     I spent 33 minutes caring for Annika on this date of service. This time includes time spent by me in the following activities:preparing for the visit, reviewing tests, performing a medically appropriate examination and/or evaluation , counseling and educating the patient/family/caregiver, ordering medications, tests, or procedures and documenting information in the medical record  Follow Up   Return if symptoms worsen or fail to improve, for Recheck, or sooner as needed.  Patient was given instructions and counseling regarding her condition or for health maintenance advice. Please see specific information pulled into the AVS if appropriate.

## 2021-06-15 ENCOUNTER — OFFICE VISIT (OUTPATIENT)
Dept: FAMILY MEDICINE CLINIC | Facility: CLINIC | Age: 86
End: 2021-06-15

## 2021-06-15 VITALS
BODY MASS INDEX: 23.57 KG/M2 | HEIGHT: 63 IN | SYSTOLIC BLOOD PRESSURE: 111 MMHG | DIASTOLIC BLOOD PRESSURE: 85 MMHG | HEART RATE: 119 BPM | WEIGHT: 133 LBS

## 2021-06-15 DIAGNOSIS — Z12.31 VISIT FOR SCREENING MAMMOGRAM: ICD-10-CM

## 2021-06-15 DIAGNOSIS — Z78.0 POSTMENOPAUSE: ICD-10-CM

## 2021-06-15 DIAGNOSIS — Z00.00 MEDICARE ANNUAL WELLNESS VISIT, SUBSEQUENT: Primary | ICD-10-CM

## 2021-06-15 PROCEDURE — G0439 PPPS, SUBSEQ VISIT: HCPCS | Performed by: NURSE PRACTITIONER

## 2021-06-15 NOTE — PROGRESS NOTES
The ABCs of the Annual Wellness Visit  Subsequent Medicare Wellness Visit    Chief Complaint   Patient presents with   • Medicare Wellness-subsequent       Subjective   History of Present Illness:  Annika Kimball is a 85 y.o. female who presents for a Subsequent Medicare Wellness Visit.    HEALTH RISK ASSESSMENT    Recent Hospitalizations:  Recently treated at the following:  Other: [facility], in    Current Medical Providers:  Patient Care Team:  Stephanie Abdullahi APRN as PCP - General (Family Medicine)    Smoking Status:  Social History     Tobacco Use   Smoking Status Current Every Day Smoker   • Packs/day: 1.00   • Years: 40.00   • Pack years: 40.00   • Types: Cigarettes   Smokeless Tobacco Never Used       Alcohol Consumption:  Social History     Substance and Sexual Activity   Alcohol Use No       Depression Screen:   PHQ-2/PHQ-9 Depression Screening 6/15/2021   Little interest or pleasure in doing things 0   Feeling down, depressed, or hopeless 0   Trouble falling or staying asleep, or sleeping too much 0   Feeling tired or having little energy 0   Poor appetite or overeating 0   Feeling bad about yourself - or that you are a failure or have let yourself or your family down 0   Trouble concentrating on things, such as reading the newspaper or watching television 0   Moving or speaking so slowly that other people could have noticed. Or the opposite - being so fidgety or restless that you have been moving around a lot more than usual 0   Thoughts that you would be better off dead, or of hurting yourself in some way 0   Total Score 0   If you checked off any problems, how difficult have these problems made it for you to do your work, take care of things at home, or get along with other people? Not difficult at all       Fall Risk Screen:  STEADI Fall Risk Assessment has not been completed.    Health Habits and Functional and Cognitive Screening:  Functional & Cognitive Status 6/15/2021   Do you have  difficulty preparing food and eating? No   Do you have difficulty bathing yourself, getting dressed or grooming yourself? No   Do you have difficulty using the toilet? No   Do you have difficulty moving around from place to place? No   Do you have trouble with steps or getting out of a bed or a chair? Yes   Current Diet Well Balanced Diet   Dental Exam Not up to date   Eye Exam Up to date   Exercise (times per week) 3 times per week   Current Exercises Include House Cleaning   Do you need help using the phone?  No   Are you deaf or do you have serious difficulty hearing?  Yes   Do you need help with transportation? No   Do you need help shopping? No   Do you need help preparing meals?  No   Do you need help with housework?  No   Do you need help with laundry? Yes   Do you need help taking your medications? Yes   Do you need help managing money? Yes   Do you ever drive or ride in a car without wearing a seat belt? No   Have you felt unusual stress, anger or loneliness in the last month? No   Who do you live with? Child   If you need help, do you have trouble finding someone available to you? No   Have you been bothered in the last four weeks by sexual problems? No   Do you have difficulty concentrating, remembering or making decisions? Yes         Does the patient have evidence of cognitive impairment? No    Asprin use counseling:Taking ASA appropriately as indicated    Age-appropriate Screening Schedule:  Refer to the list below for future screening recommendations based on patient's age, sex and/or medical conditions. Orders for these recommended tests are listed in the plan section. The patient has been provided with a written plan.    Health Maintenance   Topic Date Due   • DXA SCAN  Never done   • TDAP/TD VACCINES (1 - Tdap) Never done   • ZOSTER VACCINE (1 of 2) Never done   • INFLUENZA VACCINE  08/01/2021          The following portions of the patient's history were reviewed and updated as appropriate:  "allergies, current medications, past family history, past medical history, past social history, past surgical history and problem list.    Outpatient Medications Prior to Visit   Medication Sig Dispense Refill   • ASPIRIN 81 PO Take  by mouth.     • levETIRAcetam (KEPPRA) 750 MG tablet Take 1 tablet by mouth 2 (Two) Times a Day for 181 days. 60 tablet 6   • atorvastatin (LIPITOR) 40 MG tablet      • azithromycin (Zithromax Z-Billy) 250 MG tablet Take 2 po now and 1 po d2-5 6 tablet 0   • brompheniramine-pseudoephedrine-DM 30-2-10 MG/5ML syrup Take 5 mL by mouth 4 (Four) Times a Day As Needed for Congestion or Cough. 118 mL 0   • enalapril (Vasotec) 10 MG tablet Take 1 tablet by mouth Daily. 30 tablet 2     No facility-administered medications prior to visit.       Patient Active Problem List   Diagnosis   • Tobacco user   • Tobacco abuse counseling   • Ischemic chest pain (CMS/[facility])   • Essential hypertension   • Hypertensive heart disease with heart failure    • Dyslipidemia   • Lower abdominal pain   • Pain of upper abdomen   • Diarrhea       Advanced Care Planning:  ACP discussion was held with the patient during this visit. Patient has an advance directive (not in EMR), copy requested.    Review of Systems    Compared to one year ago, the patient feels her physical health is better.  Compared to one year ago, the patient feels her mental health is better.    Reviewed chart for potential of high risk medication in the elderly: yes  Reviewed chart for potential of harmful drug interactions in the elderly:yes    Objective         Vitals:    06/15/21 1116   BP: 111/85   Pulse: 119   Weight: 60.3 kg (133 lb)   Height: 160 cm (63\")   PainSc: 0-No pain       Body mass index is 23.56 kg/m².  Discussed the patient's BMI with her. The BMI is in the acceptable range.    Physical Exam          Assessment/Plan   Medicare Risks and Personalized Health Plan  CMS Preventative Services Quick Reference  Advance Directive " Discussion  Breast Cancer/Mammogram Screening  Cardiovascular risk  Colon Cancer Screening  Dementia/Memory   Depression/Dysphoria  Diabetic Lab Screening   Fall Risk  Hearing Problem  Immunizations Discussed/Encouraged (specific immunizations; Td, Pneumococcal 23 and Shingrix )  Inactivity/Sedentary  Lung Cancer Risk  Osteoporosis Risk  Polypharmacy    The above risks/problems have been discussed with the patient.  Pertinent information has been shared with the patient in the After Visit Summary.  Follow up plans and orders are seen below in the Assessment/Plan Section.    Diagnoses and all orders for this visit:    1. Medicare annual wellness visit, subsequent (Primary)    2. Postmenopause  -     DEXA Bone Density Axial; Future    3. Visit for screening mammogram  -     Mammo Screening Bilateral With CAD; Future      Follow Up:  Return if symptoms worsen or fail to improve, for Recheck, or sooner as needed.     An After Visit Summary and PPPS were given to the patient.

## 2021-06-16 DIAGNOSIS — E78.5 HYPERLIPIDEMIA, UNSPECIFIED HYPERLIPIDEMIA TYPE: Primary | ICD-10-CM

## 2021-06-17 ENCOUNTER — LAB (OUTPATIENT)
Dept: LAB | Facility: OTHER | Age: 86
End: 2021-06-17

## 2021-06-17 DIAGNOSIS — E78.5 HYPERLIPIDEMIA, UNSPECIFIED HYPERLIPIDEMIA TYPE: ICD-10-CM

## 2021-06-17 LAB
ALBUMIN SERPL-MCNC: 4.1 G/DL (ref 3.5–5)
ALBUMIN/GLOB SERPL: 1.4 G/DL (ref 1.1–1.8)
ALP SERPL-CCNC: 118 U/L (ref 38–126)
ALT SERPL W P-5'-P-CCNC: 11 U/L
ANION GAP SERPL CALCULATED.3IONS-SCNC: 5 MMOL/L (ref 5–15)
AST SERPL-CCNC: 22 U/L (ref 14–36)
BASOPHILS # BLD AUTO: 0.03 10*3/MM3 (ref 0–0.2)
BASOPHILS NFR BLD AUTO: 0.5 % (ref 0–1.5)
BILIRUB SERPL-MCNC: 1.3 MG/DL (ref 0.2–1.3)
BUN SERPL-MCNC: 16 MG/DL (ref 7–23)
BUN/CREAT SERPL: 14.5 (ref 7–25)
CALCIUM SPEC-SCNC: 9.6 MG/DL (ref 8.4–10.2)
CHLORIDE SERPL-SCNC: 107 MMOL/L (ref 101–112)
CHOLEST SERPL-MCNC: 192 MG/DL (ref 150–200)
CO2 SERPL-SCNC: 26 MMOL/L (ref 22–30)
CREAT SERPL-MCNC: 1.1 MG/DL (ref 0.52–1.04)
DEPRECATED RDW RBC AUTO: 42.5 FL (ref 37–54)
EOSINOPHIL # BLD AUTO: 0.15 10*3/MM3 (ref 0–0.4)
EOSINOPHIL NFR BLD AUTO: 2.3 % (ref 0.3–6.2)
ERYTHROCYTE [DISTWIDTH] IN BLOOD BY AUTOMATED COUNT: 12.5 % (ref 12.3–15.4)
GFR SERPL CREATININE-BSD FRML MDRD: 47 ML/MIN/1.73 (ref 39–90)
GLOBULIN UR ELPH-MCNC: 2.9 GM/DL (ref 2.3–3.5)
GLUCOSE SERPL-MCNC: 115 MG/DL (ref 70–99)
HCT VFR BLD AUTO: 40.3 % (ref 34–46.6)
HDLC SERPL-MCNC: 67 MG/DL (ref 40–59)
HGB BLD-MCNC: 13.5 G/DL (ref 12–15.9)
LDLC SERPL CALC-MCNC: 113 MG/DL
LDLC/HDLC SERPL: 1.67 {RATIO} (ref 0–3.22)
LYMPHOCYTES # BLD AUTO: 2.18 10*3/MM3 (ref 0.7–3.1)
LYMPHOCYTES NFR BLD AUTO: 32.8 % (ref 19.6–45.3)
MCH RBC QN AUTO: 31.8 PG (ref 26.6–33)
MCHC RBC AUTO-ENTMCNC: 33.5 G/DL (ref 31.5–35.7)
MCV RBC AUTO: 95 FL (ref 79–97)
MONOCYTES # BLD AUTO: 0.5 10*3/MM3 (ref 0.1–0.9)
MONOCYTES NFR BLD AUTO: 7.5 % (ref 5–12)
NEUTROPHILS NFR BLD AUTO: 3.79 10*3/MM3 (ref 1.7–7)
NEUTROPHILS NFR BLD AUTO: 56.9 % (ref 42.7–76)
PLATELET # BLD AUTO: 296 10*3/MM3 (ref 140–450)
PMV BLD AUTO: 9.2 FL (ref 6–12)
POTASSIUM SERPL-SCNC: 4.3 MMOL/L (ref 3.4–5)
PROT SERPL-MCNC: 7 G/DL (ref 6.3–8.6)
RBC # BLD AUTO: 4.24 10*6/MM3 (ref 3.77–5.28)
SODIUM SERPL-SCNC: 138 MMOL/L (ref 137–145)
T3FREE SERPL-MCNC: 2.99 PG/ML (ref 2–4.4)
T4 SERPL-MCNC: 7.82 MCG/DL (ref 4.5–11.7)
TRIGL SERPL-MCNC: 66 MG/DL
TSH SERPL DL<=0.05 MIU/L-ACNC: 3.24 UIU/ML (ref 0.27–4.2)
VLDLC SERPL-MCNC: 12 MG/DL (ref 5–40)
WBC # BLD AUTO: 6.65 10*3/MM3 (ref 3.4–10.8)

## 2021-06-17 PROCEDURE — 84481 FREE ASSAY (FT-3): CPT | Performed by: NURSE PRACTITIONER

## 2021-06-17 PROCEDURE — 84436 ASSAY OF TOTAL THYROXINE: CPT | Performed by: NURSE PRACTITIONER

## 2021-06-17 PROCEDURE — 80061 LIPID PANEL: CPT | Performed by: NURSE PRACTITIONER

## 2021-06-17 PROCEDURE — 84443 ASSAY THYROID STIM HORMONE: CPT | Performed by: NURSE PRACTITIONER

## 2021-06-17 PROCEDURE — 85025 COMPLETE CBC W/AUTO DIFF WBC: CPT | Performed by: NURSE PRACTITIONER

## 2021-06-17 PROCEDURE — 80053 COMPREHEN METABOLIC PANEL: CPT | Performed by: NURSE PRACTITIONER

## 2021-06-17 PROCEDURE — 36415 COLL VENOUS BLD VENIPUNCTURE: CPT | Performed by: NURSE PRACTITIONER

## 2021-06-23 ENCOUNTER — OFFICE VISIT (OUTPATIENT)
Dept: FAMILY MEDICINE CLINIC | Facility: CLINIC | Age: 86
End: 2021-06-23

## 2021-06-23 VITALS
WEIGHT: 138 LBS | OXYGEN SATURATION: 98 % | HEART RATE: 93 BPM | DIASTOLIC BLOOD PRESSURE: 82 MMHG | SYSTOLIC BLOOD PRESSURE: 122 MMHG | TEMPERATURE: 97.6 F | BODY MASS INDEX: 24.45 KG/M2

## 2021-06-23 DIAGNOSIS — M54.42 CHRONIC MIDLINE LOW BACK PAIN WITH BILATERAL SCIATICA: Primary | ICD-10-CM

## 2021-06-23 DIAGNOSIS — M25.562 CHRONIC PAIN OF BOTH KNEES: ICD-10-CM

## 2021-06-23 DIAGNOSIS — M54.41 CHRONIC MIDLINE LOW BACK PAIN WITH BILATERAL SCIATICA: Primary | ICD-10-CM

## 2021-06-23 DIAGNOSIS — M25.561 CHRONIC PAIN OF BOTH KNEES: ICD-10-CM

## 2021-06-23 DIAGNOSIS — G89.29 CHRONIC PAIN OF BOTH KNEES: ICD-10-CM

## 2021-06-23 DIAGNOSIS — G89.29 CHRONIC MIDLINE LOW BACK PAIN WITH BILATERAL SCIATICA: Primary | ICD-10-CM

## 2021-06-23 DIAGNOSIS — Z23 IMMUNIZATION DUE: ICD-10-CM

## 2021-06-23 PROBLEM — S09.90XA CLOSED HEAD INJURY: Status: ACTIVE | Noted: 2021-06-23

## 2021-06-23 PROBLEM — M85.80 OSTEOPENIA: Status: ACTIVE | Noted: 2021-06-23

## 2021-06-23 PROBLEM — Z87.19 HISTORY OF DIVERTICULOSIS: Status: ACTIVE | Noted: 2021-06-23

## 2021-06-23 PROBLEM — E55.9 VITAMIN D DEFICIENCY: Status: ACTIVE | Noted: 2020-09-25
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
## 2021-06-23 PROBLEM — E83.51 HYPOCALCEMIA: Status: ACTIVE | Noted: 2021-06-23

## 2021-06-23 PROBLEM — K52.9 COLITIS: Status: ACTIVE | Noted: 2021-06-23

## 2021-06-23 PROBLEM — I82.432 DEEP VEIN THROMBOSIS (DVT) OF POPLITEAL VEIN OF LEFT LOWER EXTREMITY (HCC): Status: ACTIVE | Noted: 2021-06-23

## 2021-06-23 PROBLEM — M19.072 OSTEOARTHRITIS OF LEFT FOOT: Status: ACTIVE | Noted: 2021-06-23

## 2021-06-23 PROBLEM — M79.672 FOOT PAIN, LEFT: Status: ACTIVE | Noted: 2021-06-23

## 2021-06-23 PROBLEM — S80.10XA MULTIPLE LEG CONTUSIONS: Status: ACTIVE | Noted: 2021-06-23

## 2021-06-23 PROBLEM — E78.5 HYPERLIPIDEMIA: Status: ACTIVE | Noted: 2020-09-25

## 2021-06-23 PROBLEM — R29.90 STROKE-LIKE SYMPTOMS: Status: ACTIVE | Noted: 2020-09-24

## 2021-06-23 PROCEDURE — G0009 ADMIN PNEUMOCOCCAL VACCINE: HCPCS | Performed by: NURSE PRACTITIONER

## 2021-06-23 PROCEDURE — 99214 OFFICE O/P EST MOD 30 MIN: CPT | Performed by: NURSE PRACTITIONER

## 2021-06-23 PROCEDURE — 96372 THER/PROPH/DIAG INJ SC/IM: CPT | Performed by: NURSE PRACTITIONER

## 2021-06-23 PROCEDURE — 90715 TDAP VACCINE 7 YRS/> IM: CPT | Performed by: NURSE PRACTITIONER

## 2021-06-23 PROCEDURE — 90472 IMMUNIZATION ADMIN EACH ADD: CPT | Performed by: NURSE PRACTITIONER

## 2021-06-23 PROCEDURE — 90732 PPSV23 VACC 2 YRS+ SUBQ/IM: CPT | Performed by: NURSE PRACTITIONER

## 2021-06-23 RX ORDER — PREDNISONE 20 MG/1
20 TABLET ORAL 2 TIMES DAILY
Qty: 10 TABLET | Refills: 0 | Status: SHIPPED | OUTPATIENT
Start: 2021-06-23 | End: 2021-06-28

## 2021-06-23 RX ORDER — METHYLPREDNISOLONE ACETATE 80 MG/ML
60 INJECTION, SUSPENSION INTRA-ARTICULAR; INTRALESIONAL; INTRAMUSCULAR; SOFT TISSUE ONCE
Status: COMPLETED | OUTPATIENT
Start: 2021-06-23 | End: 2021-06-23

## 2021-06-23 RX ADMIN — METHYLPREDNISOLONE ACETATE 60 MG: 80 INJECTION, SUSPENSION INTRA-ARTICULAR; INTRALESIONAL; INTRAMUSCULAR; SOFT TISSUE at 16:11

## 2021-06-23 NOTE — PROGRESS NOTES
Chief Complaint  Arthritis    Subjective          The patient presents in the office today for a follow-up on chronic conditions. She is complaining of lower back pain, BLE pain, Bilat knee pain. She is also needing some immunizations. DEXA scan and mammogram are already scheduled for 07/2021. She is accompanied by her daughter. In addition, she is experiencing pain bilaterally, but usually worse in her left side than her right, in her hips, knees, and shoulders present for at least 6 months. Her lower back hurts as well, especially upon waking, and she is experiencing lower extremity weakness, also worse in her left side than her right. She has been taking 1 Extra Strength Tylenol per day for pain relief.     She had blood work done on 06/17/2021 and is currently taking Keppra.    Annika Kimball presents to [facility]  History of Present Illness    Objective   Vital Signs:   /82 (BP Location: Left arm, Patient Position: Sitting, Cuff Size: Adult)   Pulse 93   Temp 97.6 °F (36.4 °C) (Tympanic)   Wt 62.6 kg (138 lb)   SpO2 98%   BMI 24.45 kg/m²     Physical Exam  Vitals and nursing note reviewed.   Constitutional:       General: She is not in acute distress.     Appearance: Normal appearance. She is not ill-appearing, toxic-appearing or diaphoretic.   HENT:      Head: Normocephalic and atraumatic.   Cardiovascular:      Rate and Rhythm: Normal rate and regular rhythm.      Pulses: Normal pulses.      Heart sounds: Normal heart sounds. No murmur heard.   No friction rub. No gallop.    Pulmonary:      Effort: Pulmonary effort is normal. No respiratory distress.      Breath sounds: No stridor. Decreased breath sounds present. No wheezing, rhonchi or rales.      Comments: Lung fields are diminished throughout. Pulse ox on RA 98%  Musculoskeletal:         General: Tenderness present.      Lumbar back: Tenderness and bony tenderness present. Decreased range of motion.       Right knee: Swelling, bony tenderness and crepitus present. Decreased range of motion. Tenderness present.      Left knee: Swelling, bony tenderness and crepitus present. Decreased range of motion. Tenderness present.      Right lower leg: Edema present.      Left lower leg: Edema present.      Comments: She has tenderness to palpation of the lumbar area. To the right and left of the lumbar spine she is also tender.  She is ambulatory with assistance. Lower extremities with a trace of pitting edema bilaterally.         Skin:     General: Skin is warm and dry.      Comments: Varicose veins are noted.    Neurological:      Mental Status: She is alert and oriented to person, place, and time.   Psychiatric:         Mood and Affect: Mood normal.         Behavior: Behavior normal.         Thought Content: Thought content normal.         Judgment: Judgment normal.        Result Review :     Common labs    Common Labsle 9/24/20 6/17/21 6/17/21 6/17/21     0921 0921 0921   Glucose   115 (A)    BUN   16    Creatinine   1.10 (A)    eGFR Non African Am   47    Sodium   138    Potassium   4.3    Chloride   107    Calcium   9.6    Albumin   4.10    Total Bilirubin   1.3    Alkaline Phosphatase   118    AST (SGOT)   22    ALT (SGPT)   11    WBC  6.65     Hemoglobin  13.5     Hematocrit  40.3     Platelets  296     Total Cholesterol    192   Triglycerides    66   HDL Cholesterol    67 (A)   LDL Cholesterol     113 (A)   Hemoglobin A1C 5.6      (A) Abnormal value       Comments are available for some flowsheets but are not being displayed.                     Assessment and Plan    Diagnoses and all orders for this visit:    1. Chronic midline low back pain with bilateral sciatica (Primary)  -     XR Spine Lumbar 2 or 3 View  -     XR Knee 1 or 2 View Bilateral; Future  -     methylPREDNISolone acetate (DEPO-medrol) injection 60 mg    2. Chronic pain of both knees    3. Immunization due    Other orders  -     Cancel: Shingrix  Vaccine  -     Tdap Vaccine Greater Than or Equal To 6yo IM  -     Pneumococcal Polysaccharide Vaccine 23-Valent (PPSV23) Greater Than or Equal To 1yo Subcutaneous / IM  -     Zoster Vac Recomb Adjuvanted 50 MCG/0.5ML reconstituted suspension; Inject 0.5 mL into the appropriate muscle as directed by prescriber 1 (One) Time for 1 dose.  Dispense: 1 each; Refill: 1  -     predniSONE (DELTASONE) 20 MG tablet; Take 1 tablet by mouth 2 (Two) Times a Day for 5 days.  Dispense: 10 tablet; Refill: 0    She is given tetanus, Pneumovax 23, and Depo-Medrol 60 mg in office today, shadi well. She will be given prednisone 20 mg twice per day for 5 days to start over the weekend, if her symptoms persist or worsen. She is also prescribed Shingrix to obtain at pharmacy. I will obtain x-rays of bilateral knees and lumbar spine and notify her of results via telephone. If her symptoms should persist or worsen, she will return to clinic for follow-up. Otherwise, I will see her back as scheduled for chronic conditions. All questions and concerns are addressed with understanding noted. The patient is in agreement to above plan.    I spent 33 minutes caring for Annika on this date of service. This time includes time spent by me in the following activities:preparing for the visit, reviewing tests, performing a medically appropriate examination and/or evaluation , counseling and educating the patient/family/caregiver, ordering medications, tests, or procedures and documenting information in the medical record  Follow Up   Return if symptoms worsen or fail to improve, for Recheck, or sooner as needed.  Patient was given instructions and counseling regarding her condition or for health maintenance advice. Please see specific information pulled into the AVS if appropriate.       Scribed for ADRIENNE Elise by Crystal Felton.  06/23/21   16:22 CDT    I have personally performed the services described in this document as scribed by the above  individual, and it is both accurate and complete.  Stephanie Abdullahi, APRN  6/23/2021  17:56 CDT

## 2021-06-24 ENCOUNTER — TELEPHONE (OUTPATIENT)
Dept: FAMILY MEDICINE CLINIC | Facility: CLINIC | Age: 86
End: 2021-06-24

## 2021-06-24 NOTE — TELEPHONE ENCOUNTER
I advised the patient's daughter of the spine XR. They do not want to pursue MRI, PT or Chiropractor at this time.     ----- Message from ADRIENNE De Anda sent at 6/24/2021 11:42 AM CDT -----  Inform pt /daughter, if bone density not UTD please order  Obtain MRI l/s if pt wants to pursue . If not please document declines and can consider PT referral, chiropractor if so

## 2021-06-24 NOTE — TELEPHONE ENCOUNTER
I advised the patient's daughter of the knee XR. They will let us know if it gets worse.     ----- Message from ADRIENNE De Anda sent at 6/24/2021  4:31 PM CDT -----  Inform pt /daughter. Can refer to ortho if desires/pain persists/worsens

## 2021-10-25 RX ORDER — LEVETIRACETAM 750 MG/1
TABLET ORAL
Qty: 60 TABLET | Refills: 6 | Status: SHIPPED | OUTPATIENT
Start: 2021-10-25 | End: 2022-05-31

## 2021-12-02 RX ORDER — RISEDRONATE SODIUM 150 MG/1
150 TABLET, FILM COATED ORAL
Qty: 3 TABLET | Refills: 3 | Status: SHIPPED | OUTPATIENT
Start: 2021-12-02 | End: 2022-09-14

## 2022-05-31 RX ORDER — LEVETIRACETAM 750 MG/1
TABLET ORAL
Qty: 60 TABLET | Refills: 6 | Status: SHIPPED | OUTPATIENT
Start: 2022-05-31 | End: 2022-12-28

## 2022-08-16 DIAGNOSIS — H91.93 DECREASED HEARING OF BOTH EARS: Primary | ICD-10-CM

## 2022-09-12 DIAGNOSIS — I10 ESSENTIAL HYPERTENSION: Primary | ICD-10-CM

## 2022-09-13 ENCOUNTER — LAB (OUTPATIENT)
Dept: LAB | Facility: OTHER | Age: 87
End: 2022-09-13

## 2022-09-13 DIAGNOSIS — R73.09 ELEVATED GLUCOSE: ICD-10-CM

## 2022-09-13 DIAGNOSIS — E78.2 MIXED HYPERLIPIDEMIA: ICD-10-CM

## 2022-09-13 DIAGNOSIS — I10 ESSENTIAL HYPERTENSION: ICD-10-CM

## 2022-09-13 DIAGNOSIS — R73.09 ELEVATED GLUCOSE: Primary | ICD-10-CM

## 2022-09-13 LAB
ALBUMIN SERPL-MCNC: 3.6 G/DL (ref 3.5–5)
ALBUMIN/GLOB SERPL: 1.2 G/DL (ref 1.1–1.8)
ALP SERPL-CCNC: 140 U/L (ref 38–126)
ALT SERPL W P-5'-P-CCNC: 10 U/L
ANION GAP SERPL CALCULATED.3IONS-SCNC: 5 MMOL/L (ref 5–15)
AST SERPL-CCNC: 20 U/L (ref 14–36)
BASOPHILS # BLD AUTO: 0.03 10*3/MM3 (ref 0–0.2)
BASOPHILS NFR BLD AUTO: 0.3 % (ref 0–1.5)
BILIRUB SERPL-MCNC: 0.7 MG/DL (ref 0.2–1.3)
BUN SERPL-MCNC: 14 MG/DL (ref 7–23)
BUN/CREAT SERPL: 19.2 (ref 7–25)
CALCIUM SPEC-SCNC: 9.1 MG/DL (ref 8.4–10.2)
CHLORIDE SERPL-SCNC: 106 MMOL/L (ref 101–112)
CHOLEST SERPL-MCNC: 177 MG/DL (ref 150–200)
CO2 SERPL-SCNC: 25 MMOL/L (ref 22–30)
CREAT SERPL-MCNC: 0.73 MG/DL (ref 0.52–1.04)
DEPRECATED RDW RBC AUTO: 44 FL (ref 37–54)
EGFRCR SERPLBLD CKD-EPI 2021: 80.7 ML/MIN/1.73
EOSINOPHIL # BLD AUTO: 0.27 10*3/MM3 (ref 0–0.4)
EOSINOPHIL NFR BLD AUTO: 3 % (ref 0.3–6.2)
ERYTHROCYTE [DISTWIDTH] IN BLOOD BY AUTOMATED COUNT: 13.9 % (ref 12.3–15.4)
GLOBULIN UR ELPH-MCNC: 3.1 GM/DL (ref 2.3–3.5)
GLUCOSE SERPL-MCNC: 113 MG/DL (ref 70–99)
HCT VFR BLD AUTO: 36.9 % (ref 34–46.6)
HDLC SERPL-MCNC: 47 MG/DL (ref 40–59)
HGB BLD-MCNC: 11.9 G/DL (ref 12–15.9)
LDLC SERPL CALC-MCNC: 116 MG/DL
LDLC/HDLC SERPL: 2.44 {RATIO} (ref 0–3.22)
LYMPHOCYTES # BLD AUTO: 2.25 10*3/MM3 (ref 0.7–3.1)
LYMPHOCYTES NFR BLD AUTO: 25.3 % (ref 19.6–45.3)
MCH RBC QN AUTO: 28.2 PG (ref 26.6–33)
MCHC RBC AUTO-ENTMCNC: 32.2 G/DL (ref 31.5–35.7)
MCV RBC AUTO: 87.4 FL (ref 79–97)
MONOCYTES # BLD AUTO: 0.75 10*3/MM3 (ref 0.1–0.9)
MONOCYTES NFR BLD AUTO: 8.4 % (ref 5–12)
NEUTROPHILS NFR BLD AUTO: 5.58 10*3/MM3 (ref 1.7–7)
NEUTROPHILS NFR BLD AUTO: 63 % (ref 42.7–76)
PLATELET # BLD AUTO: 476 10*3/MM3 (ref 140–450)
PMV BLD AUTO: 8.3 FL (ref 6–12)
POTASSIUM SERPL-SCNC: 3.8 MMOL/L (ref 3.4–5)
PROT SERPL-MCNC: 6.7 G/DL (ref 6.3–8.6)
RBC # BLD AUTO: 4.22 10*6/MM3 (ref 3.77–5.28)
SODIUM SERPL-SCNC: 136 MMOL/L (ref 137–145)
T3FREE SERPL-MCNC: 3.21 PG/ML (ref 2–4.4)
T4 SERPL-MCNC: 9.07 MCG/DL (ref 4.5–11.7)
TRIGL SERPL-MCNC: 76 MG/DL
TSH SERPL DL<=0.05 MIU/L-ACNC: 3.36 UIU/ML (ref 0.27–4.2)
VLDLC SERPL-MCNC: 14 MG/DL (ref 5–40)
WBC NRBC COR # BLD: 8.88 10*3/MM3 (ref 3.4–10.8)

## 2022-09-13 PROCEDURE — 84436 ASSAY OF TOTAL THYROXINE: CPT | Performed by: NURSE PRACTITIONER

## 2022-09-13 PROCEDURE — 80061 LIPID PANEL: CPT | Performed by: NURSE PRACTITIONER

## 2022-09-13 PROCEDURE — 84481 FREE ASSAY (FT-3): CPT | Performed by: NURSE PRACTITIONER

## 2022-09-13 PROCEDURE — 36415 COLL VENOUS BLD VENIPUNCTURE: CPT | Performed by: NURSE PRACTITIONER

## 2022-09-13 PROCEDURE — 83036 HEMOGLOBIN GLYCOSYLATED A1C: CPT | Performed by: NURSE PRACTITIONER

## 2022-09-13 PROCEDURE — 80053 COMPREHEN METABOLIC PANEL: CPT | Performed by: NURSE PRACTITIONER

## 2022-09-13 PROCEDURE — 85025 COMPLETE CBC W/AUTO DIFF WBC: CPT | Performed by: NURSE PRACTITIONER

## 2022-09-13 PROCEDURE — 84443 ASSAY THYROID STIM HORMONE: CPT | Performed by: NURSE PRACTITIONER

## 2022-09-14 ENCOUNTER — OFFICE VISIT (OUTPATIENT)
Dept: FAMILY MEDICINE CLINIC | Facility: CLINIC | Age: 87
End: 2022-09-14

## 2022-09-14 VITALS
OXYGEN SATURATION: 98 % | BODY MASS INDEX: 21.79 KG/M2 | WEIGHT: 118.4 LBS | HEART RATE: 104 BPM | DIASTOLIC BLOOD PRESSURE: 80 MMHG | HEIGHT: 62 IN | SYSTOLIC BLOOD PRESSURE: 140 MMHG

## 2022-09-14 DIAGNOSIS — Z72.0 TOBACCO USER: ICD-10-CM

## 2022-09-14 DIAGNOSIS — I10 ESSENTIAL HYPERTENSION: Chronic | ICD-10-CM

## 2022-09-14 DIAGNOSIS — E78.2 MIXED HYPERLIPIDEMIA: Primary | ICD-10-CM

## 2022-09-14 DIAGNOSIS — M19.90 OSTEOARTHRITIS, UNSPECIFIED OSTEOARTHRITIS TYPE, UNSPECIFIED SITE: ICD-10-CM

## 2022-09-14 DIAGNOSIS — M54.42 CHRONIC MIDLINE LOW BACK PAIN WITH BILATERAL SCIATICA: Chronic | ICD-10-CM

## 2022-09-14 DIAGNOSIS — R56.9 CONVULSIONS, UNSPECIFIED CONVULSION TYPE: ICD-10-CM

## 2022-09-14 DIAGNOSIS — E78.2 MIXED HYPERLIPIDEMIA: Chronic | ICD-10-CM

## 2022-09-14 DIAGNOSIS — H35.3230 BILATERAL EXUDATIVE AGE-RELATED MACULAR DEGENERATION, UNSPECIFIED STAGE: ICD-10-CM

## 2022-09-14 DIAGNOSIS — I11.0 HYPERTENSIVE HEART DISEASE WITH HEART FAILURE: ICD-10-CM

## 2022-09-14 DIAGNOSIS — Z00.00 MEDICARE ANNUAL WELLNESS VISIT, SUBSEQUENT: Primary | ICD-10-CM

## 2022-09-14 DIAGNOSIS — G89.29 CHRONIC PAIN OF BOTH KNEES: Chronic | ICD-10-CM

## 2022-09-14 DIAGNOSIS — M54.41 CHRONIC MIDLINE LOW BACK PAIN WITH BILATERAL SCIATICA: Chronic | ICD-10-CM

## 2022-09-14 DIAGNOSIS — G89.29 CHRONIC MIDLINE LOW BACK PAIN WITH BILATERAL SCIATICA: Chronic | ICD-10-CM

## 2022-09-14 DIAGNOSIS — M25.562 CHRONIC PAIN OF BOTH KNEES: Chronic | ICD-10-CM

## 2022-09-14 DIAGNOSIS — M25.561 CHRONIC PAIN OF BOTH KNEES: Chronic | ICD-10-CM

## 2022-09-14 LAB
BILIRUB UR QL STRIP: NEGATIVE
CLARITY UR: CLEAR
COLOR UR: ABNORMAL
GLUCOSE UR STRIP-MCNC: NEGATIVE MG/DL
HBA1C MFR BLD: 5.9 % (ref 4.8–5.6)
HGB UR QL STRIP.AUTO: NEGATIVE
KETONES UR QL STRIP: NEGATIVE
LEUKOCYTE ESTERASE UR QL STRIP.AUTO: NEGATIVE
NITRITE UR QL STRIP: NEGATIVE
PH UR STRIP.AUTO: 7 [PH] (ref 5.5–8)
PROT UR QL STRIP: NEGATIVE
SP GR UR STRIP: 1.02 (ref 1–1.03)
UROBILINOGEN UR QL STRIP: ABNORMAL

## 2022-09-14 PROCEDURE — 1159F MED LIST DOCD IN RCRD: CPT | Performed by: NURSE PRACTITIONER

## 2022-09-14 PROCEDURE — 81003 URINALYSIS AUTO W/O SCOPE: CPT | Performed by: NURSE PRACTITIONER

## 2022-09-14 PROCEDURE — 99213 OFFICE O/P EST LOW 20 MIN: CPT | Performed by: NURSE PRACTITIONER

## 2022-09-14 PROCEDURE — G0439 PPPS, SUBSEQ VISIT: HCPCS | Performed by: NURSE PRACTITIONER

## 2022-09-14 PROCEDURE — 96372 THER/PROPH/DIAG INJ SC/IM: CPT | Performed by: NURSE PRACTITIONER

## 2022-09-14 RX ORDER — TRIAMCINOLONE ACETONIDE 40 MG/ML
60 INJECTION, SUSPENSION INTRA-ARTICULAR; INTRAMUSCULAR ONCE
Status: COMPLETED | OUTPATIENT
Start: 2022-09-14 | End: 2022-09-14

## 2022-09-14 RX ADMIN — TRIAMCINOLONE ACETONIDE 60 MG: 40 INJECTION, SUSPENSION INTRA-ARTICULAR; INTRAMUSCULAR at 15:30

## 2022-09-14 NOTE — PROGRESS NOTES
"The ABCs of the Annual Wellness Visit  Subsequent Medicare Wellness Visit    Chief Complaint   Patient presents with   • Medicare Wellness-subsequent     With labs      Subjective    History of Present Illness:  Annika Kimball is a 86 y.o. female who presents for a Subsequent Medicare Wellness Visit. She is also here for recheck of htn, hyperlipidemia, and she continues to smoke.     The patient presents today for a Medicare wellness visit. She is accompanied by her daughter today. The patient notes that she is unable to see and her vision is almost completely gone. She states that she is seeing Dr. Best for her macular degeneration. The patient reports that she has not been hospitalized in the last year. She admits that she is \" aching all over \". The patient adds that she takes Tylenol and naproxen intermittently. She denies chest pain or headaches. The patient adds that she is sleeping well. She states that she is not eating well. The patient notes that she has lost approximately 10 pounds. She states that she is taking a baby aspirin daily. The patient admits that she is still smoking. She is still taking Keppra for elderly seizures. Her last one was 2 years ago.     The following portions of the patient's history were reviewed and   updated as appropriate: allergies, current medications, past family history, past medical history, past social history, past surgical history and problem list.    Compared to one year ago, the patient feels her physical   health is the same.    Compared to one year ago, the patient feels her mental   health is the same.    Recent Hospitalizations:  She was not admitted to the hospital during the last year.       Current Medical Providers:  Patient Care Team:  Stephanie Abdullahi APRN as PCP - General (Family Medicine)    Outpatient Medications Prior to Visit   Medication Sig Dispense Refill   • ASPIRIN 81 PO Take  by mouth.     • Calcium Carbonate-Vit D-Min (CALCIUM 1200 PO) Take  " "by mouth.     • levETIRAcetam (KEPPRA) 750 MG tablet TAKE 1 TABLET BY MOUTH 2 (TWO) TIMES A DAY. 60 tablet 6   • risedronate (Actonel) 150 MG tablet Take 1 tablet by mouth Every 30 (Thirty) Days. with water on empty stomach, nothing by mouth or lie down for next 30 minutes. 3 tablet 3     No facility-administered medications prior to visit.       No opioid medication identified on active medication list. I have reviewed chart for other potential  high risk medication/s and harmful drug interactions in the elderly.        Aspirin is on active medication list. Aspirin use is indicated based on review of current medical condition/s. Pros and cons of this therapy have been discussed today. Benefits of this medication outweigh potential harm.  Patient has been encouraged to continue taking this medication.  .    Patient Active Problem List   Diagnosis   • Tobacco user   • Tobacco abuse counseling   • Ischemic chest pain (HCC)   • Essential hypertension   • Hypertensive heart disease with heart failure    • Dyslipidemia   • Lower abdominal pain   • Pain of upper abdomen   • Diarrhea   • Closed head injury   • Colitis   • Fall   • Foot pain, left   • History of diverticulosis   • Hyperlipidemia   • Hypocalcemia   • Deep vein thrombosis (DVT) of popliteal vein of left lower extremity (HCC)   • Multiple leg contusions   • Osteoarthritis of left foot   • Osteopenia   • Stroke-like symptoms   • Vitamin D deficiency     Advance Care Planning  Advance Directive is on file.  ACP discussion was held with the patient during this visit. Patient has an advance directive in EMR which is still valid.         Objective    Vitals:    09/14/22 1439   BP: 140/80   Pulse: 104   SpO2: 98%   Weight: 53.7 kg (118 lb 6.4 oz)   Height: 157.5 cm (62\")   PainSc:   5   PainLoc: Generalized     Estimated body mass index is 21.66 kg/m² as calculated from the following:    Height as of this encounter: 157.5 cm (62\").    Weight as of this encounter: " 53.7 kg (118 lb 6.4 oz).    BMI is within normal parameters. No other follow-up for BMI required.      Does the patient have evidence of cognitive impairment? No    Physical Exam  Vitals and nursing note reviewed.   Constitutional:       General: She is not in acute distress.     Appearance: Normal appearance. She is normal weight. She is not ill-appearing, toxic-appearing or diaphoretic.   HENT:      Head: Normocephalic and atraumatic.   Eyes:      Comments: Vision greatly diminished bilaterally.   Neck:      Vascular: No carotid bruit.   Cardiovascular:      Rate and Rhythm: Normal rate and regular rhythm.      Pulses: Normal pulses.      Heart sounds: Normal heart sounds. No murmur heard.    No friction rub. No gallop.   Pulmonary:      Effort: Pulmonary effort is normal. No respiratory distress.      Breath sounds: No stridor. Examination of the right-upper field reveals decreased breath sounds. Examination of the left-upper field reveals decreased breath sounds. Examination of the right-middle field reveals decreased breath sounds. Examination of the left-middle field reveals decreased breath sounds. Examination of the right-lower field reveals decreased breath sounds. Decreased breath sounds present. No wheezing, rhonchi or rales.      Comments: Lung fields diminished throughout. Pulse ox on RA 98%  Musculoskeletal:         General: Tenderness present.      Lumbar back: Tenderness present.      Right knee: Tenderness present.      Left knee: Tenderness present.   Skin:     General: Skin is warm and dry.      Coloration: Skin is not jaundiced or pale.      Findings: No bruising, erythema, lesion or rash.   Neurological:      Mental Status: She is alert and oriented to person, place, and time.      Gait: Gait abnormal.      Comments: Patient complaining of joint pain, is able to get on and off of the examination table with moderate to max assistance.   Psychiatric:         Mood and Affect: Mood normal.          Behavior: Behavior normal.         Thought Content: Thought content normal.         Judgment: Judgment normal.       Lab Results   Component Value Date    TRIG 76 09/13/2022    HDL 47 09/13/2022     (H) 09/13/2022    VLDL 14 09/13/2022    HGBA1C 5.90 (H) 09/13/2022            HEALTH RISK ASSESSMENT    Smoking Status:  Social History     Tobacco Use   Smoking Status Current Every Day Smoker   • Packs/day: 1.00   • Years: 40.00   • Pack years: 40.00   • Types: Cigarettes   Smokeless Tobacco Never Used     Alcohol Consumption:  Social History     Substance and Sexual Activity   Alcohol Use No     Fall Risk Screen:    CHRISTIANADI Fall Risk Assessment was completed, and patient is at LOW risk for falls.Assessment completed on:9/14/2022    Depression Screening:  PHQ-2/PHQ-9 Depression Screening 9/14/2022   Retired PHQ-9 Total Score -   Retired Total Score -   Little Interest or Pleasure in Doing Things 1-->several days   Feeling Down, Depressed or Hopeless 0-->not at all   PHQ-9: Brief Depression Severity Measure Score 1       Health Habits and Functional and Cognitive Screening:  Functional & Cognitive Status 9/14/2022   Do you have difficulty preparing food and eating? Yes   Do you have difficulty bathing yourself, getting dressed or grooming yourself? No   Do you have difficulty using the toilet? No   Do you have difficulty moving around from place to place? No   Do you have trouble with steps or getting out of a bed or a chair? Yes   Current Diet Well Balanced Diet   Dental Exam Not up to date   Eye Exam Up to date   Exercise (times per week) 3 times per week   Current Exercises Include Walking   Do you need help using the phone?  No   Are you deaf or do you have serious difficulty hearing?  Yes   Do you need help with transportation? No   Do you need help shopping? Yes   Do you need help preparing meals?  Yes   Do you need help with housework?  Yes   Do you need help with laundry? Yes   Do you need help taking your  medications? No   Do you need help managing money? No   Do you ever drive or ride in a car without wearing a seat belt? No   Have you felt unusual stress, anger or loneliness in the last month? No   Who do you live with? Child   If you need help, do you have trouble finding someone available to you? No   Have you been bothered in the last four weeks by sexual problems? No   Do you have difficulty concentrating, remembering or making decisions? No       Age-appropriate Screening Schedule:  Refer to the list below for future screening recommendations based on patient's age, sex and/or medical conditions. Orders for these recommended tests are listed in the plan section. The patient has been provided with a written plan.    Health Maintenance   Topic Date Due   • INFLUENZA VACCINE  10/01/2022   • LIPID PANEL  09/13/2023   • DXA SCAN  12/01/2023   • TDAP/TD VACCINES (2 - Td or Tdap) 06/23/2031   • ZOSTER VACCINE  Completed        Assessment & Plan   CMS Preventative Services Quick Reference  Risk Factors Identified During Encounter  Cardiovascular Disease  Fall Risk-High or Moderate  Glaucoma or Family History of Glaucoma  Immunizations Discussed/Encouraged (specific Immunizations; Pneumococcal 23  Polypharmacy  Tobacco Use/Dependance (use dotphrase .tobaccocessation for documentation)  The above risks/problems have been discussed with the patient.  Follow up actions/plans if indicated are seen below in the Assessment/Plan Section.  Pertinent information has been shared with the patient in the After Visit Summary.    Diagnoses and all orders for this visit:    1. Medicare annual wellness visit, subsequent (Primary)    2. Mixed hyperlipidemia    3. Essential hypertension    4. Tobacco user    5. Osteoarthritis, unspecified osteoarthritis type, unspecified site  -     triamcinolone acetonide (KENALOG-40) injection 60 mg    6. Chronic midline low back pain with bilateral sciatica    7. Chronic pain of both knees    8.  Bilateral exudative age-related macular degeneration, unspecified stage ([facility])    9. Hypertensive heart disease with heart failure ([facility])    10. Convulsions, unspecified convulsion type ([facility])    Medicare wellness completed. Health maintenance reviewed and updated. She is encouraged to have Pneumovax with her next flu shot this fall and will plan on receiving this at her pharmacy. She does not need refills on medications today. She was given a Kenalog 60 mg injection in the office today and tolerated well. She will follow up as scheduled with her optometrist/ophthalmologist. . Encouraged to continue on Keppra as prescribed previously. Follow up in 6 months for recheck, sooner if needed. All questions and concerns are addressed with understanding noted. The patient is in agreement to above plan.    Follow Up:   Return in about 6 months (around 3/14/2023), or if symptoms worsen or fail to improve, for Recheck, or sooner as needed.     An After Visit Summary and PPPS were made available to the patient.        I spent 44 minutes caring for Annika on this date of service. This time includes time spent by me in the following activities:preparing for the visit, reviewing tests, performing a medically appropriate examination and/or evaluation , counseling and educating the patient/family/caregiver, ordering medications, tests, or procedures and documenting information in the medical record     Transcribed from ambient dictation for ADRIENNE Elise by HARPAL LÓPEZ.  09/14/22   16:15 CDT    Patient verbalized consent to the visit recording.  I have personally performed the services described in this document as transcribed by the above individual, and it is both accurate and complete.  ADRIENNE Elise  9/15/2022  15:32 CDT

## 2022-09-14 NOTE — PROGRESS NOTES
The ABCs of the Annual Wellness Visit  Subsequent Medicare Wellness Visit    Chief Complaint   Patient presents with   • Medicare Wellness-subsequent     With labs      Subjective    History of Present Illness:  Annika Kimball is a 86 y.o. female who presents for a Subsequent Medicare Wellness Visit.    The following portions of the patient's history were reviewed and   updated as appropriate: allergies, current medications, past family history, past medical history, past social history, past surgical history and problem list.    Compared to one year ago, the patient feels her physical   health is the same.    Compared to one year ago, the patient feels her mental   health is the same.    Recent Hospitalizations:  She was not admitted to the hospital during the last year.       Current Medical Providers:  Patient Care Team:  Stephanie Abdullahi APRN as PCP - General (Family Medicine)    Outpatient Medications Prior to Visit   Medication Sig Dispense Refill   • ASPIRIN 81 PO Take  by mouth.     • Calcium Carbonate-Vit D-Min (CALCIUM 1200 PO) Take  by mouth.     • levETIRAcetam (KEPPRA) 750 MG tablet TAKE 1 TABLET BY MOUTH 2 (TWO) TIMES A DAY. 60 tablet 6   • risedronate (Actonel) 150 MG tablet Take 1 tablet by mouth Every 30 (Thirty) Days. with water on empty stomach, nothing by mouth or lie down for next 30 minutes. 3 tablet 3     No facility-administered medications prior to visit.       No opioid medication identified on active medication list. I have reviewed chart for other potential  high risk medication/s and harmful drug interactions in the elderly.          Aspirin is on active medication list. Aspirin use is indicated based on review of current medical condition/s. Pros and cons of this therapy have been discussed today. Benefits of this medication outweigh potential harm.  Patient has been encouraged to continue taking this medication.  .      Patient Active Problem List   Diagnosis   • Tobacco user   •  "Tobacco abuse counseling   • Ischemic chest pain (HCC)   • Essential hypertension   • Hypertensive heart disease with heart failure    • Dyslipidemia   • Lower abdominal pain   • Pain of upper abdomen   • Diarrhea   • Closed head injury   • Colitis   • Fall   • Foot pain, left   • History of diverticulosis   • Hyperlipidemia   • Hypocalcemia   • Deep vein thrombosis (DVT) of popliteal vein of left lower extremity (HCC)   • Multiple leg contusions   • Osteoarthritis of left foot   • Osteopenia   • Stroke-like symptoms   • Vitamin D deficiency     Advance Care Planning  Advance Directive is on file.  ACP discussion was held with the patient during this visit. Patient has an advance directive in EMR which is still valid.           Objective    Vitals:    09/14/22 1439   BP: 140/80   Pulse: 104   SpO2: 98%   Weight: 53.7 kg (118 lb 6.4 oz)   Height: 157.5 cm (62\")   PainSc:   5   PainLoc: Generalized     Estimated body mass index is 21.66 kg/m² as calculated from the following:    Height as of this encounter: 157.5 cm (62\").    Weight as of this encounter: 53.7 kg (118 lb 6.4 oz).    BMI is within normal parameters. No other follow-up for BMI required.      Does the patient have evidence of cognitive impairment? No    Physical Exam  Lab Results   Component Value Date    TRIG 76 09/13/2022    HDL 47 09/13/2022     (H) 09/13/2022    VLDL 14 09/13/2022    HGBA1C 5.90 (H) 09/13/2022            HEALTH RISK ASSESSMENT    Smoking Status:  Social History     Tobacco Use   Smoking Status Current Every Day Smoker   • Packs/day: 1.00   • Years: 40.00   • Pack years: 40.00   • Types: Cigarettes   Smokeless Tobacco Never Used     Alcohol Consumption:  Social History     Substance and Sexual Activity   Alcohol Use No     Fall Risk Screen:    STEADI Fall Risk Assessment was completed, and patient is at LOW risk for falls.Assessment completed on:9/14/2022    Depression Screening:  PHQ-2/PHQ-9 Depression Screening 9/14/2022 "   Retired PHQ-9 Total Score -   Retired Total Score -   Little Interest or Pleasure in Doing Things 1-->several days   Feeling Down, Depressed or Hopeless 0-->not at all   PHQ-9: Brief Depression Severity Measure Score 1       Health Habits and Functional and Cognitive Screening:  Functional & Cognitive Status 9/14/2022   Do you have difficulty preparing food and eating? Yes   Do you have difficulty bathing yourself, getting dressed or grooming yourself? No   Do you have difficulty using the toilet? No   Do you have difficulty moving around from place to place? No   Do you have trouble with steps or getting out of a bed or a chair? Yes   Current Diet Well Balanced Diet   Dental Exam Not up to date   Eye Exam Up to date   Exercise (times per week) 3 times per week   Current Exercises Include Walking   Do you need help using the phone?  No   Are you deaf or do you have serious difficulty hearing?  Yes   Do you need help with transportation? No   Do you need help shopping? Yes   Do you need help preparing meals?  Yes   Do you need help with housework?  Yes   Do you need help with laundry? Yes   Do you need help taking your medications? No   Do you need help managing money? No   Do you ever drive or ride in a car without wearing a seat belt? No   Have you felt unusual stress, anger or loneliness in the last month? No   Who do you live with? Child   If you need help, do you have trouble finding someone available to you? No   Have you been bothered in the last four weeks by sexual problems? No   Do you have difficulty concentrating, remembering or making decisions? No       Age-appropriate Screening Schedule:  Refer to the list below for future screening recommendations based on patient's age, sex and/or medical conditions. Orders for these recommended tests are listed in the plan section. The patient has been provided with a written plan.    Health Maintenance   Topic Date Due   • INFLUENZA VACCINE  10/01/2022   • LIPID  PANEL  09/13/2023   • DXA SCAN  12/01/2023   • TDAP/TD VACCINES (2 - Td or Tdap) 06/23/2031   • ZOSTER VACCINE  Completed              Assessment & Plan   CMS Preventative Services Quick Reference  Risk Factors Identified During Encounter  Cardiovascular Disease  Fall Risk-High or Moderate  Glaucoma or Family History of Glaucoma  Immunizations Discussed/Encouraged (specific Immunizations; Pneumococcal 23  Polypharmacy  Tobacco Use/Dependance (use dotphrase .tobaccocessation for documentation)  The above risks/problems have been discussed with the patient.  Follow up actions/plans if indicated are seen below in the Assessment/Plan Section.  Pertinent information has been shared with the patient in the After Visit Summary.    Diagnoses and all orders for this visit:    1. Medicare annual wellness visit, subsequent (Primary)    2. Mixed hyperlipidemia    3. Essential hypertension    4. Tobacco user    5. Osteoarthritis, unspecified osteoarthritis type, unspecified site  -     triamcinolone acetonide (KENALOG-40) injection 60 mg        Follow Up:   Return in about 6 months (around 3/14/2023), or if symptoms worsen or fail to improve, for Recheck, or sooner as needed.     An After Visit Summary and PPPS were made available to the patient.          I spent 33 minutes caring for Annika on this date of service. This time includes time spent by me in the following activities:preparing for the visit, reviewing tests, performing a medically appropriate examination and/or evaluation , counseling and educating the patient/family/caregiver, ordering medications, tests, or procedures and documenting information in the medical record

## 2022-10-25 ENCOUNTER — CLINICAL SUPPORT (OUTPATIENT)
Dept: AUDIOLOGY | Facility: CLINIC | Age: 87
End: 2022-10-25

## 2022-10-25 ENCOUNTER — OFFICE VISIT (OUTPATIENT)
Dept: OTOLARYNGOLOGY | Facility: CLINIC | Age: 87
End: 2022-10-25

## 2022-10-25 VITALS — WEIGHT: 112.2 LBS | BODY MASS INDEX: 20.65 KG/M2 | TEMPERATURE: 97.2 F | HEIGHT: 62 IN

## 2022-10-25 DIAGNOSIS — H93.299 LOSS OF SPEECH DISCRIMINATION: ICD-10-CM

## 2022-10-25 DIAGNOSIS — H90.3 SENSORINEURAL HEARING LOSS (SNHL) OF BOTH EARS: Primary | ICD-10-CM

## 2022-10-25 DIAGNOSIS — H93.293 IMPAIRMENT OF AUDITORY DISCRIMINATION OF BOTH EARS: ICD-10-CM

## 2022-10-25 DIAGNOSIS — H90.3 SENSORINEURAL HEARING LOSS OF BOTH EARS: Primary | ICD-10-CM

## 2022-10-25 PROCEDURE — 99203 OFFICE O/P NEW LOW 30 MIN: CPT | Performed by: OTOLARYNGOLOGY

## 2022-10-25 PROCEDURE — 92557 COMPREHENSIVE HEARING TEST: CPT | Performed by: AUDIOLOGIST

## 2022-10-25 PROCEDURE — 92567 TYMPANOMETRY: CPT | Performed by: AUDIOLOGIST

## 2022-10-25 NOTE — PROGRESS NOTES
STANDARD AUDIOMETRIC EVALUATION      Name:  Annika Kimball  :  1935  Age:  86 y.o.  Date of Evaluation:  10/25/2022      HISTORY    Reason for visit:  Annika Kimball is seen today for a hearing test at the request of Dr. Bert Schilling.  Patient reports she has poor hearing.  She states people have to talk loud to her.  She states she does not have hearing aids.  Reportedly, she has macular degeneration and vision problems as well.       EVALUATION    See Audiogram    RESULTS        Otoscopy and Tympanometry 226 Hz :  Right Ear:  Otoscopy:  Testing completed after ears were examined by the ENT physician          Tympanometry:  Middle ear function within normal limits    Left Ear:   Otoscopy:  Testing completed after ears were examined by the ENT physician        Tympanometry:  Middle ear function within normal limits    Test technique:  Standard Audiometry     Pure Tone Audiometry:   Patient responded to pure tones at 40-80 dB for 250-8000 Hz in right ear, and at 40-70 dB for 250-8000 Hz in left ear.       Speech Audiometry:        Right Ear:  Speech Reception Threshold (SRT) was obtained at 50 dBHL                 Speech Discrimination scores were 0% in quiet when words were presented at 85 dBHL       Left Ear:  Speech Reception Threshold (SRT) was obtained at 55 dBHL                 Speech Discrimination scores were 0% in quiet when words were presented at 80 dBHL    Reliability:   good    IMPRESSIONS:  1.  Tympanometry results are consistent with Middle ear function within normal limits in both ears.  2.  Pure tone results are consistent with moderate to severe sloping sensorineural hearing loss  for right ear, and moderate to moderately severe sloping sensorineural hearing loss  in left ear.       RECOMMENDATIONS:  Patient is seeing the Ear Nose and Throat physician immediately following this examination.  It was a pleasure seeing Annika Kimball in Audiology today.  We would be happy to do  further testing or discuss these test as necessary.          This document has been electronically signed by Natalee Valencia MS CCC-WHIT on October 25, 2022 16:41 CDT       Natalee Valencia MS CCC-WHIT  Licensed Audiologist

## 2022-10-26 NOTE — PROGRESS NOTES
Subjective   Annika Kimball is a 86 y.o. female.       History of Present Illness   Patient reports her hearing is decreasing.  This is been going on for several years.  She also has macular degeneration and so has difficulty with visual impairment.  No previous otologic surgery.      The following portions of the patient's history were reviewed and updated as appropriate: allergies, current medications, past family history, past medical history, past social history, past surgical history and problem list.     reports that she has been smoking cigarettes. She started smoking about 66 years ago. She has a 40.00 pack-year smoking history. She has never used smokeless tobacco. She reports that she does not drink alcohol and does not use drugs.   Patient is a tobacco user and has been counseled for use of tobacco products      Review of Systems        Objective   Physical Exam  Ears: External ears no deformity, canals no discharge, tympanic membranes intact clear and mobile bilaterally.    Audiogram is obtained and reviewed and shows a bilateral symmetrical relatively flat moderate to moderately severe sensorineural hearing loss.  Tympanograms are type a bilaterally.  Discrimination score is 0% bilaterally         Assessment and Plan   Diagnoses and all orders for this visit:    1. Sensorineural hearing loss of both ears (Primary)    2. Loss of speech discrimination             Plan: Explained findings to the patient and her daughter.  With 0% discrimination amplification may be of limited benefit but would still be the only option available at this point for improved hearing.  Suggested they try the over-the-counter hearing aids first since her hearing loss is essentially flat this may be of benefit.  If ineffective could consider conventional amplification if they so desire.  May follow-up with me as needed.

## 2022-12-28 RX ORDER — LEVETIRACETAM 750 MG/1
TABLET, EXTENDED RELEASE ORAL
Qty: 60 TABLET | Refills: 6 | Status: SHIPPED | OUTPATIENT
Start: 2022-12-28

## 2023-03-07 ENCOUNTER — OFFICE VISIT (OUTPATIENT)
Dept: FAMILY MEDICINE CLINIC | Facility: CLINIC | Age: 88
End: 2023-03-07
Payer: MEDICARE

## 2023-03-07 VITALS
HEIGHT: 62 IN | BODY MASS INDEX: 21.46 KG/M2 | WEIGHT: 116.6 LBS | OXYGEN SATURATION: 97 % | HEART RATE: 109 BPM | SYSTOLIC BLOOD PRESSURE: 148 MMHG | DIASTOLIC BLOOD PRESSURE: 80 MMHG | TEMPERATURE: 97.2 F

## 2023-03-07 DIAGNOSIS — M79.89 LEG SWELLING: Primary | ICD-10-CM

## 2023-03-07 DIAGNOSIS — Z71.6 TOBACCO ABUSE COUNSELING: Chronic | ICD-10-CM

## 2023-03-07 DIAGNOSIS — E55.9 VITAMIN D DEFICIENCY: ICD-10-CM

## 2023-03-07 DIAGNOSIS — R73.09 ELEVATED GLUCOSE: ICD-10-CM

## 2023-03-07 DIAGNOSIS — M25.50 ARTHRALGIA, UNSPECIFIED JOINT: ICD-10-CM

## 2023-03-07 DIAGNOSIS — E78.5 HYPERLIPIDEMIA, UNSPECIFIED HYPERLIPIDEMIA TYPE: Chronic | ICD-10-CM

## 2023-03-07 DIAGNOSIS — Z23 NEED FOR PNEUMOCOCCAL VACCINATION: ICD-10-CM

## 2023-03-07 DIAGNOSIS — I10 ESSENTIAL HYPERTENSION: Chronic | ICD-10-CM

## 2023-03-07 DIAGNOSIS — R53.83 FATIGUE, UNSPECIFIED TYPE: ICD-10-CM

## 2023-03-07 DIAGNOSIS — I11.0 HYPERTENSIVE HEART DISEASE WITH HEART FAILURE: ICD-10-CM

## 2023-03-07 PROCEDURE — G0009 ADMIN PNEUMOCOCCAL VACCINE: HCPCS | Performed by: NURSE PRACTITIONER

## 2023-03-07 PROCEDURE — 90677 PCV20 VACCINE IM: CPT | Performed by: NURSE PRACTITIONER

## 2023-03-07 PROCEDURE — 99214 OFFICE O/P EST MOD 30 MIN: CPT | Performed by: NURSE PRACTITIONER

## 2023-03-07 PROCEDURE — 96372 THER/PROPH/DIAG INJ SC/IM: CPT | Performed by: NURSE PRACTITIONER

## 2023-03-07 RX ORDER — METHYLPREDNISOLONE ACETATE 40 MG/ML
80 INJECTION, SUSPENSION INTRA-ARTICULAR; INTRALESIONAL; INTRAMUSCULAR; SOFT TISSUE ONCE
Qty: 2 ML | Refills: 0 | Status: SHIPPED | OUTPATIENT
Start: 2023-03-07 | End: 2023-03-07

## 2023-03-07 RX ORDER — METHYLPREDNISOLONE ACETATE 40 MG/ML
80 INJECTION, SUSPENSION INTRA-ARTICULAR; INTRALESIONAL; INTRAMUSCULAR; SOFT TISSUE ONCE
Status: COMPLETED | OUTPATIENT
Start: 2023-03-07 | End: 2023-03-07

## 2023-03-07 RX ORDER — CYANOCOBALAMIN 1000 UG/ML
1000 INJECTION, SOLUTION INTRAMUSCULAR; SUBCUTANEOUS
Status: SHIPPED | OUTPATIENT
Start: 2023-03-07

## 2023-03-07 RX ORDER — FUROSEMIDE 20 MG/1
20 TABLET ORAL 2 TIMES DAILY
Qty: 30 TABLET | Refills: 0 | Status: SHIPPED | OUTPATIENT
Start: 2023-03-07

## 2023-03-07 RX ADMIN — METHYLPREDNISOLONE ACETATE 80 MG: 40 INJECTION, SUSPENSION INTRA-ARTICULAR; INTRALESIONAL; INTRAMUSCULAR; SOFT TISSUE at 16:16

## 2023-03-07 RX ADMIN — CYANOCOBALAMIN 1000 MCG: 1000 INJECTION, SOLUTION INTRAMUSCULAR; SUBCUTANEOUS at 16:15

## 2023-03-07 NOTE — PROGRESS NOTES
"Chief Complaint  Leg Swelling and Shoulder Pain (Bilateral shoulder pain/)    Subjective        Annika Vee Kimball presents to [facility]  History of Present Illness  Patient here today with daughter complaining of legs swelling, denies any increased shortness of breath or chest pain.  She does complain of joints aching all over but shoulders are worse, she has used Tylenol and naproxen as needed about every other day with minimal symptom relief.  Also complains of fatigue.  Due for labs and requesting B12 injection today.  She states that she continues to smoke.  Has received benefit from steroids in the past for joint pain, low back pain.  States that she is able to sleep at night and does not even use 1 pillow to sleep with.    Objective   Vital Signs:  /80   Pulse 109   Temp 97.2 °F (36.2 °C)   Ht 157.5 cm (62\")   Wt 52.9 kg (116 lb 9.6 oz)   SpO2 97%   BMI 21.33 kg/m²   Estimated body mass index is 21.33 kg/m² as calculated from the following:    Height as of this encounter: 157.5 cm (62\").    Weight as of this encounter: 52.9 kg (116 lb 9.6 oz).     BMI is within normal parameters. No other follow-up for BMI required.    Physical Exam  Vitals and nursing note reviewed.   Constitutional:       General: She is not in acute distress.     Appearance: Normal appearance. She is normal weight. She is not ill-appearing, toxic-appearing or diaphoretic.   HENT:      Head: Normocephalic and atraumatic.   Cardiovascular:      Rate and Rhythm: Normal rate and regular rhythm.      Heart sounds: Normal heart sounds. No murmur heard.    No friction rub. No gallop.   Pulmonary:      Effort: Pulmonary effort is normal. No respiratory distress.      Breath sounds: No stridor. Examination of the right-upper field reveals decreased breath sounds. Examination of the left-upper field reveals decreased breath sounds. Examination of the right-middle field reveals " decreased breath sounds. Examination of the left-middle field reveals decreased breath sounds. Examination of the right-lower field reveals decreased breath sounds. Examination of the left-lower field reveals decreased breath sounds. Decreased breath sounds present. No wheezing, rhonchi or rales.      Comments: Lung fields are greatly diminished throughout but without wheezes rhonchi or rales auscultated and pulse ox on room air is 97%  Musculoskeletal:         General: Tenderness present.      Right shoulder: Tenderness and bony tenderness present. Decreased range of motion.      Left shoulder: Tenderness and bony tenderness present. Decreased range of motion.      Lumbar back: Tenderness present. Decreased range of motion.      Right lower leg: Edema present.      Left lower leg: Edema present.      Comments: She has 2+ pitting edema to ankles bilaterally right slightly worse than the left this swelling does extend up her lower legs as well to bilateral mid lower legs   Skin:     General: Skin is warm and dry.      Coloration: Skin is not jaundiced or pale.      Findings: No bruising, erythema, lesion or rash.   Neurological:      Mental Status: She is alert.      Motor: Weakness present.      Gait: Gait abnormal.      Comments: Ambulatory with gait slowed, guarded with assist of daughter   Psychiatric:         Mood and Affect: Mood normal.         Behavior: Behavior normal.         Thought Content: Thought content normal.         Judgment: Judgment normal.        Result Review :    CMP    CMP 9/13/22   Glucose 113 (A)   BUN 14   Creatinine 0.73   eGFR 80.7   Sodium 136 (A)   Potassium 3.8   Chloride 106   Calcium 9.1   Total Protein 6.7   Albumin 3.60   Globulin 3.1   Total Bilirubin 0.7   Alkaline Phosphatase 140 (A)   AST (SGOT) 20   ALT (SGPT) 10   Albumin/Globulin Ratio 1.2   BUN/Creatinine Ratio 19.2   Anion Gap 5.0   (A) Abnormal value       Comments are available for some flowsheets but are not being  displayed.           CBC    CBC 9/13/22   WBC 8.88   RBC 4.22   Hemoglobin 11.9 (A)   Hematocrit 36.9   MCV 87.4   MCH 28.2   MCHC 32.2   RDW 13.9   Platelets 476 (A)   (A) Abnormal value            CBC w/diff    CBC w/Diff 9/13/22   WBC 8.88   RBC 4.22   Hemoglobin 11.9 (A)   Hematocrit 36.9   MCV 87.4   MCH 28.2   MCHC 32.2   RDW 13.9   Platelets 476 (A)   Neutrophil Rel % 63.0   Lymphocyte Rel % 25.3   Monocyte Rel % 8.4   Eosinophil Rel % 3.0   Basophil Rel % 0.3   (A) Abnormal value            Lipid Panel    Lipid Panel 9/13/22   Total Cholesterol 177   Triglycerides 76   HDL Cholesterol 47   VLDL Cholesterol 14   LDL Cholesterol  116 (A)   LDL/HDL Ratio 2.44   (A) Abnormal value            TSH    TSH 9/13/22   TSH 3.360                    Assessment and Plan   Diagnoses and all orders for this visit:    1. Leg swelling (Primary)  -     CBC & Differential; Future  -     Comprehensive metabolic panel; Future  -     Vitamin D 25 hydroxy; Future  -     Vitamin B12; Future  -     Hemoglobin A1c; Future  -     Lipid panel; Future  -     BNP; Future  -     furosemide (Lasix) 20 MG tablet; Take 1 tablet by mouth 2 (Two) Times a Day.  Dispense: 30 tablet; Refill: 0  -     XR Chest 2 View; Future  -     methylPREDNISolone acetate (DEPO-medrol) injection 80 mg    2. Need for pneumococcal vaccination  -     Pneumococcal Conjugate Vaccine 20-Valent (PCV20)    3. Essential hypertension  -     CBC & Differential; Future  -     Comprehensive metabolic panel; Future  -     Vitamin D 25 hydroxy; Future  -     Vitamin B12; Future  -     Hemoglobin A1c; Future  -     Lipid panel; Future  -     BNP; Future  -     XR Chest 2 View; Future    4. Tobacco abuse counseling  -     XR Chest 2 View; Future    5. Arthralgia, unspecified joint  -     methylPREDNISolone acetate (DEPO-medrol) injection 80 mg    6. Fatigue, unspecified type  -     Hemoglobin A1c; Future  -     cyanocobalamin injection 1,000 mcg    7. Vitamin D deficiency  -      Vitamin D 25 hydroxy; Future    8. Hyperlipidemia, unspecified hyperlipidemia type  -     Hemoglobin A1c; Future    9. Hypertensive heart disease with heart failure (HCC)  -     Hemoglobin A1c; Future  -     BNP; Future    10. Elevated glucose  -     Hemoglobin A1c; Future    Other orders  -     Discontinue: methylPREDNISolone acetate (DEPO-medrol) 40 MG/ML injection; Inject 2 mL into the appropriate muscle as directed by prescriber 1 (One) Time for 1 dose.  Dispense: 2 mL; Refill: 0    Labs to be obtained as above chest x-ray will be obtained as above and she will be informed of results via phone.  She started on Lasix daily for the next 2 to 3 days and then can take as needed.  Advised to monitor weight overnight and if she gains more than 2 to 3 pounds overnight she is asked to go ahead and take this as well.  She will follow-up in 1 month for recheck or sooner if needed.  Advised to use compression stockings to the lower extremities bilaterally.  B12 injection is given in office today, tolerated well.  She is also given Depo-Medrol 80 mg injection in office today IM, tolerated well and pneumococcal 2 immunization in office today, tolerated well.  Advised to stop smoking, declines.  If her joint pain persists she will let me know, she can continue using Tylenol alternating with naproxen as needed pain if needed.  All questions and concerns addressed with understanding verbalized.  Patient and daughter aware and in agreement to this plan.     I spent 30 minutes caring for Annika on this date of service. This time includes time spent by me in the following activities:preparing for the visit, reviewing tests, performing a medically appropriate examination and/or evaluation , counseling and educating the patient/family/caregiver, ordering medications, tests, or procedures and documenting information in the medical record  Follow Up   Return in about 4 weeks (around 4/4/2023), or if symptoms worsen or fail to  improve, for Recheck, or sooner as needed.  Patient was given instructions and counseling regarding her condition or for health maintenance advice. Please see specific information pulled into the AVS if appropriate.

## 2023-03-14 ENCOUNTER — TELEPHONE (OUTPATIENT)
Dept: FAMILY MEDICINE CLINIC | Facility: CLINIC | Age: 88
End: 2023-03-14
Payer: MEDICARE

## 2023-03-14 NOTE — TELEPHONE ENCOUNTER
Contacted the patients daughter per verbal release form and reminded her of the fasting labs and imaging in place. She stated her mother would not get out in cold weather, but she would attempt to bring her Friday. Orders extended.

## 2023-08-02 RX ORDER — LEVETIRACETAM 750 MG/1
750 TABLET, EXTENDED RELEASE ORAL 2 TIMES DAILY
Qty: 60 TABLET | Refills: 0 | Status: SHIPPED | OUTPATIENT
Start: 2023-08-02

## 2023-08-18 ENCOUNTER — TELEPHONE (OUTPATIENT)
Dept: FAMILY MEDICINE CLINIC | Facility: CLINIC | Age: 88
End: 2023-08-18
Payer: MEDICARE

## 2023-08-18 NOTE — TELEPHONE ENCOUNTER
JONG Antonio 08/18/2023  Contacted the patients daughter per verbal release and reminded her of the fasting lab orders in place. She stated she will attempt to bring her to the clinic but she does not like to leave the house. I stated understanding. Lab orders extended.

## 2023-08-28 ENCOUNTER — HOSPITAL ENCOUNTER (EMERGENCY)
Facility: HOSPITAL | Age: 88
Discharge: HOME | End: 2023-08-28
Attending: EMERGENCY MEDICINE | Admitting: EMERGENCY MEDICINE
Payer: MEDICARE

## 2023-08-28 ENCOUNTER — APPOINTMENT (OUTPATIENT)
Dept: CT IMAGING | Facility: HOSPITAL | Age: 88
End: 2023-08-28
Payer: MEDICARE

## 2023-08-28 ENCOUNTER — APPOINTMENT (OUTPATIENT)
Dept: GENERAL RADIOLOGY | Facility: HOSPITAL | Age: 88
End: 2023-08-28
Payer: MEDICARE

## 2023-08-28 VITALS
OXYGEN SATURATION: 95 % | BODY MASS INDEX: 21.26 KG/M2 | HEART RATE: 96 BPM | RESPIRATION RATE: 20 BRPM | DIASTOLIC BLOOD PRESSURE: 71 MMHG | TEMPERATURE: 97.8 F | HEIGHT: 63 IN | SYSTOLIC BLOOD PRESSURE: 167 MMHG | WEIGHT: 120 LBS

## 2023-08-28 DIAGNOSIS — M19.90 ARTHRITIS: Primary | ICD-10-CM

## 2023-08-28 PROCEDURE — 73523 X-RAY EXAM HIPS BI 5/> VIEWS: CPT

## 2023-08-28 PROCEDURE — 72128 CT CHEST SPINE W/O DYE: CPT

## 2023-08-28 PROCEDURE — 99284 EMERGENCY DEPT VISIT MOD MDM: CPT

## 2023-08-28 PROCEDURE — 72131 CT LUMBAR SPINE W/O DYE: CPT

## 2023-08-28 NOTE — ED PROVIDER NOTES
Subjective   History of Present Illness  87 year old female is brought via EMS from home accompanied by her daughter with complaint of back and hip pain. No falls. Daughter reports patient has become more bed bound and is not walking like she used to. Patient denies weakness, numbness, or tingling. No bowel or bladder issues.     Family history, surgical history, social history, current medications and allergies are reviewed with the patient and triage documentation and vitals are reviewed.      History provided by:  Patient, relative and medical records   used: No      Review of Systems   Gastrointestinal:  Negative for abdominal pain and diarrhea.   Genitourinary:  Negative for decreased urine volume and difficulty urinating.   Musculoskeletal:  Positive for arthralgias and back pain. Negative for neck pain.   Skin:  Negative for color change and wound.     Past Medical History:   Diagnosis Date    Acute bronchitis     Acute sinusitis     Dizziness     Dyslipidemia     Dysuria     Essential hypertension     Gastroesophageal reflux disease     Generalized abdominal pain     HL (hearing loss) 2016    Seizures 2019    Tobacco user        Allergies   Allergen Reactions    Sulfa Antibiotics Hives       Past Surgical History:   Procedure Laterality Date    BREAST BIOPSY Right     Negative    BREAST SURGERY      COLONOSCOPY N/A 09/26/2018    Procedure: COLONOSCOPY;  Surgeon: Migel Gaston MD;  Location: [facility] ENDOSCOPY;  Service: Gastroenterology    ENDOSCOPY N/A 09/26/2018    Procedure: ESOPHAGOGASTRODUODENOSCOPY;  Surgeon: Migel Gaston MD;  Location: [facility] ENDOSCOPY;  Service: Gastroenterology    EYE SURGERY  2007    HAND SURGERY         Family History   Problem Relation Age of Onset    Heart failure Father        Social History     Socioeconomic History    Marital status: Single   Tobacco Use    Smoking status: Every Day     Packs/day: 1.00     Years: 40.00     Pack years: 40.00     Types:  Cigarettes     Start date: 1/1/1956    Smokeless tobacco: Never   Substance and Sexual Activity    Alcohol use: No    Drug use: No    Sexual activity: Defer           Objective   Physical Exam  Vitals and nursing note reviewed.   Constitutional:       Appearance: Normal appearance. She is normal weight.   Cardiovascular:      Rate and Rhythm: Normal rate and regular rhythm.   Pulmonary:      Effort: Pulmonary effort is normal.      Breath sounds: Normal breath sounds.   Musculoskeletal:         General: Tenderness present. No swelling, deformity or signs of injury.      Cervical back: Normal, normal range of motion and neck supple. No tenderness.      Thoracic back: Normal.      Lumbar back: Tenderness present. No deformity or signs of trauma. Decreased range of motion. Negative right straight leg raise test and negative left straight leg raise test.      Right hip: Normal.      Left hip: Tenderness present. No deformity, bony tenderness or crepitus. Normal range of motion. Normal strength.      Right lower leg: Edema present.      Left lower leg: Edema present.   Skin:     General: Skin is warm and dry.      Capillary Refill: Capillary refill takes less than 2 seconds.   Neurological:      General: No focal deficit present.      Mental Status: She is alert and oriented to person, place, and time.      Cranial Nerves: No cranial nerve deficit.      Sensory: No sensory deficit.      Motor: No weakness.      Coordination: Coordination normal.   Psychiatric:         Mood and Affect: Mood normal.       Procedures  none         ED Course    Labs Reviewed - No data to display  CT Thoracic Spine Without Contrast, CT Lumbar Spine Without Contrast    Result Date: 8/28/2023  Narrative: INDICATION: Pain COMPARISON: None. TECHNIQUE: Volumetric CT scan of the thoracic and lumbar spine without intravenous contrast.  2D axial, sagittal, and coronal reformats were performed. FINDINGS: Vertebral heights and alignment are normal. No  acute fracture is identified. Multilevel degenerative changes are present. No high-grade canal narrowing is appreciated. There is a trace right pleural effusion.     Impression: No acute findings.    XR Hips Bilateral With or Without Pelvis 5 View    Result Date: 8/28/2023  Narrative: INDICATION: Pain COMPARISON: None.     Impression: FINDINGS/IMPRESSION: No fracture or osseous lesion is identified.         Medical Decision Making  Problems Addressed:  Arthritis: complicated acute illness or injury    Amount and/or Complexity of Data Reviewed  Radiology: ordered.    Thoracic and lumbar imaging as well as bilateral hips and pelvis reveals no acute osseous abnormity.  Arthritic and degenerative change. Some bilateral lower leg and pedal edema. Chronic without respiratory complaint. Had been on lasix before. Discuss treatment with patient and daughter.  Seizure history precludes Tramadol. They do not want opiates. Discussed tylenol and motrin as well as frequent ambulation and movement. Discussed follow-up with PCP for home PT. Compression stockings and ambulation for edema.  Bilateral and mild edema. They acknowledge understanding. Daughter reports as long as she is able to lift the patient she will keep her at home. They will follow-up with PCP.    Final diagnoses:   Arthritis          ED Disposition  ED Disposition       ED Disposition   Discharge    Condition   Stable    Comment   --               Stephanie Abdullahi, APRN  [address] [place] [address]  [phone]               Medication List      No changes were made to your prescriptions during this visit.            Travis Garzon,   09/01/23 1032

## 2023-09-05 RX ORDER — LEVETIRACETAM 750 MG/1
TABLET, EXTENDED RELEASE ORAL
Qty: 60 TABLET | Refills: 0 | Status: SHIPPED | OUTPATIENT
Start: 2023-09-05

## (undated) DEVICE — SINGLE-USE BIOPSY FORCEPS: Brand: RADIAL JAW 4